# Patient Record
Sex: MALE | Race: BLACK OR AFRICAN AMERICAN | NOT HISPANIC OR LATINO | URBAN - METROPOLITAN AREA
[De-identification: names, ages, dates, MRNs, and addresses within clinical notes are randomized per-mention and may not be internally consistent; named-entity substitution may affect disease eponyms.]

---

## 2018-05-07 ENCOUNTER — HISTORICAL (OUTPATIENT)
Dept: RADIOLOGY | Facility: HOSPITAL | Age: 33
End: 2018-05-07

## 2022-04-07 ENCOUNTER — HISTORICAL (OUTPATIENT)
Dept: ADMINISTRATIVE | Facility: HOSPITAL | Age: 37
End: 2022-04-07

## 2022-04-24 VITALS
BODY MASS INDEX: 36.7 KG/M2 | WEIGHT: 276.88 LBS | DIASTOLIC BLOOD PRESSURE: 78 MMHG | OXYGEN SATURATION: 97 % | SYSTOLIC BLOOD PRESSURE: 142 MMHG | HEIGHT: 73 IN

## 2022-09-15 ENCOUNTER — HOSPITAL ENCOUNTER (EMERGENCY)
Facility: HOSPITAL | Age: 37
Discharge: HOME | End: 2022-09-16
Attending: FAMILY MEDICINE
Payer: MEDICAID

## 2022-09-15 DIAGNOSIS — K57.32 DIVERTICULITIS LARGE INTESTINE W/O PERFORATION OR ABSCESS W/O BLEEDING: Primary | ICD-10-CM

## 2022-09-15 LAB
ALBUMIN SERPL-MCNC: 4.2 GM/DL (ref 3.5–5)
ALBUMIN/GLOB SERPL: 1.1 RATIO (ref 1.1–2)
ALP SERPL-CCNC: 82 UNIT/L (ref 40–150)
ALT SERPL-CCNC: 33 UNIT/L (ref 0–55)
APPEARANCE UR: CLEAR
AST SERPL-CCNC: 19 UNIT/L (ref 5–34)
BACTERIA #/AREA URNS AUTO: ABNORMAL /HPF
BASOPHILS # BLD AUTO: 0.05 X10(3)/MCL (ref 0–0.2)
BASOPHILS NFR BLD AUTO: 0.5 %
BILIRUB UR QL STRIP.AUTO: NEGATIVE MG/DL
BILIRUBIN DIRECT+TOT PNL SERPL-MCNC: 0.4 MG/DL
BUN SERPL-MCNC: 9.7 MG/DL (ref 8.9–20.6)
CALCIUM SERPL-MCNC: 9.9 MG/DL (ref 8.4–10.2)
CHLORIDE SERPL-SCNC: 105 MMOL/L (ref 98–107)
CO2 SERPL-SCNC: 25 MMOL/L (ref 22–29)
COLOR UR AUTO: YELLOW
CREAT SERPL-MCNC: 0.77 MG/DL (ref 0.73–1.18)
EOSINOPHIL # BLD AUTO: 0.07 X10(3)/MCL (ref 0–0.9)
EOSINOPHIL NFR BLD AUTO: 0.7 %
ERYTHROCYTE [DISTWIDTH] IN BLOOD BY AUTOMATED COUNT: 13.2 % (ref 11.5–17)
GFR SERPLBLD CREATININE-BSD FMLA CKD-EPI: >60 MLS/MIN/1.73/M2
GLOBULIN SER-MCNC: 3.7 GM/DL (ref 2.4–3.5)
GLUCOSE SERPL-MCNC: 89 MG/DL (ref 74–100)
GLUCOSE UR QL STRIP.AUTO: NORMAL MG/DL
HCT VFR BLD AUTO: 48.1 % (ref 42–52)
HGB BLD-MCNC: 15.7 GM/DL (ref 14–18)
HYALINE CASTS #/AREA URNS LPF: ABNORMAL /LPF
IMM GRANULOCYTES # BLD AUTO: 0.02 X10(3)/MCL (ref 0–0.04)
IMM GRANULOCYTES NFR BLD AUTO: 0.2 %
KETONES UR QL STRIP.AUTO: NEGATIVE MG/DL
LEUKOCYTE ESTERASE UR QL STRIP.AUTO: NEGATIVE UNIT/L
LYMPHOCYTES # BLD AUTO: 3.03 X10(3)/MCL (ref 0.6–4.6)
LYMPHOCYTES NFR BLD AUTO: 32.3 %
MCH RBC QN AUTO: 26.5 PG (ref 27–31)
MCHC RBC AUTO-ENTMCNC: 32.6 MG/DL (ref 33–36)
MCV RBC AUTO: 81.1 FL (ref 80–94)
MONOCYTES # BLD AUTO: 0.47 X10(3)/MCL (ref 0.1–1.3)
MONOCYTES NFR BLD AUTO: 5 %
MUCOUS THREADS URNS QL MICRO: ABNORMAL /LPF
NEUTROPHILS # BLD AUTO: 5.7 X10(3)/MCL (ref 2.1–9.2)
NEUTROPHILS NFR BLD AUTO: 61.3 %
NITRITE UR QL STRIP.AUTO: NEGATIVE
NRBC BLD AUTO-RTO: 0 %
PH UR STRIP.AUTO: 6.5 [PH]
PLATELET # BLD AUTO: 270 X10(3)/MCL (ref 130–400)
PMV BLD AUTO: 10.6 FL (ref 7.4–10.4)
POTASSIUM SERPL-SCNC: 3.8 MMOL/L (ref 3.5–5.1)
PROT SERPL-MCNC: 7.9 GM/DL (ref 6.4–8.3)
PROT UR QL STRIP.AUTO: ABNORMAL MG/DL
RBC # BLD AUTO: 5.93 X10(6)/MCL (ref 4.7–6.1)
RBC #/AREA URNS AUTO: ABNORMAL /HPF
RBC UR QL AUTO: NEGATIVE UNIT/L
SODIUM SERPL-SCNC: 139 MMOL/L (ref 136–145)
SP GR UR STRIP.AUTO: 1.03
SQUAMOUS #/AREA URNS LPF: ABNORMAL /HPF
UROBILINOGEN UR STRIP-ACNC: NORMAL MG/DL
WBC # SPEC AUTO: 9.4 X10(3)/MCL (ref 4.5–11.5)
WBC #/AREA URNS AUTO: ABNORMAL /HPF

## 2022-09-15 PROCEDURE — 99285 EMERGENCY DEPT VISIT HI MDM: CPT | Mod: 25

## 2022-09-15 PROCEDURE — 25500020 PHARM REV CODE 255: Performed by: FAMILY MEDICINE

## 2022-09-15 PROCEDURE — 36415 COLL VENOUS BLD VENIPUNCTURE: CPT | Performed by: FAMILY MEDICINE

## 2022-09-15 PROCEDURE — 80053 COMPREHEN METABOLIC PANEL: CPT | Performed by: FAMILY MEDICINE

## 2022-09-15 PROCEDURE — 85025 COMPLETE CBC W/AUTO DIFF WBC: CPT | Performed by: FAMILY MEDICINE

## 2022-09-15 PROCEDURE — 96374 THER/PROPH/DIAG INJ IV PUSH: CPT

## 2022-09-15 PROCEDURE — 81001 URINALYSIS AUTO W/SCOPE: CPT | Performed by: FAMILY MEDICINE

## 2022-09-15 RX ORDER — AMOXICILLIN AND CLAVULANATE POTASSIUM 875; 125 MG/1; MG/1
1 TABLET, FILM COATED ORAL 2 TIMES DAILY
Qty: 20 TABLET | Refills: 0 | Status: SHIPPED | OUTPATIENT
Start: 2022-09-15 | End: 2022-09-25

## 2022-09-15 RX ORDER — KETOROLAC TROMETHAMINE 30 MG/ML
30 INJECTION, SOLUTION INTRAMUSCULAR; INTRAVENOUS
Status: COMPLETED | OUTPATIENT
Start: 2022-09-15 | End: 2022-09-16

## 2022-09-15 RX ORDER — ONDANSETRON 4 MG/1
4 TABLET, ORALLY DISINTEGRATING ORAL EVERY 6 HOURS PRN
Qty: 10 TABLET | Refills: 0 | Status: SHIPPED | OUTPATIENT
Start: 2022-09-15 | End: 2022-09-15 | Stop reason: SDUPTHER

## 2022-09-15 RX ORDER — AMLODIPINE BESYLATE 10 MG/1
10 TABLET ORAL DAILY
COMMUNITY
Start: 2022-09-04

## 2022-09-15 RX ORDER — ONDANSETRON 4 MG/1
4 TABLET, ORALLY DISINTEGRATING ORAL EVERY 6 HOURS PRN
Qty: 10 TABLET | Refills: 0 | Status: SHIPPED | OUTPATIENT
Start: 2022-09-15 | End: 2022-09-20

## 2022-09-15 RX ORDER — IBUPROFEN 600 MG/1
600 TABLET ORAL EVERY 6 HOURS PRN
Qty: 40 TABLET | Refills: 0 | Status: SHIPPED | OUTPATIENT
Start: 2022-09-15 | End: 2022-09-15 | Stop reason: SDUPTHER

## 2022-09-15 RX ORDER — ATENOLOL 25 MG/1
25 TABLET ORAL DAILY
COMMUNITY
Start: 2022-09-04

## 2022-09-15 RX ORDER — IBUPROFEN 600 MG/1
600 TABLET ORAL EVERY 6 HOURS PRN
Qty: 40 TABLET | Refills: 0 | Status: SHIPPED | OUTPATIENT
Start: 2022-09-15 | End: 2022-09-25

## 2022-09-15 RX ORDER — ALPRAZOLAM 0.5 MG/1
0.5 TABLET ORAL NIGHTLY
COMMUNITY
Start: 2022-09-02

## 2022-09-15 RX ORDER — AMOXICILLIN AND CLAVULANATE POTASSIUM 875; 125 MG/1; MG/1
1 TABLET, FILM COATED ORAL 2 TIMES DAILY
Qty: 20 TABLET | Refills: 0 | Status: SHIPPED | OUTPATIENT
Start: 2022-09-15 | End: 2022-09-15 | Stop reason: SDUPTHER

## 2022-09-15 RX ADMIN — IOHEXOL 100 ML: 350 INJECTION, SOLUTION INTRAVENOUS at 11:09

## 2022-09-16 VITALS
OXYGEN SATURATION: 100 % | RESPIRATION RATE: 17 BRPM | DIASTOLIC BLOOD PRESSURE: 92 MMHG | BODY MASS INDEX: 40.32 KG/M2 | WEIGHT: 304.25 LBS | HEIGHT: 73 IN | TEMPERATURE: 97 F | HEART RATE: 90 BPM | SYSTOLIC BLOOD PRESSURE: 142 MMHG

## 2022-09-16 PROCEDURE — 63600175 PHARM REV CODE 636 W HCPCS: Performed by: FAMILY MEDICINE

## 2022-09-16 PROCEDURE — 25500020 PHARM REV CODE 255: Performed by: FAMILY MEDICINE

## 2022-09-16 RX ADMIN — KETOROLAC TROMETHAMINE 30 MG: 30 INJECTION, SOLUTION INTRAMUSCULAR; INTRAVENOUS at 12:09

## 2022-09-16 NOTE — ED PROVIDER NOTES
Encounter Date: 9/15/2022       History     Chief Complaint   Patient presents with    Rectal Pain     Chronic rectal pain     37-year-old gentleman presents emergency room with complaints of rectal pain has been ongoing for the last few weeks.  Pain with having bowel movements.  Denies constipation, reports passing normal loose stools.  Patient reports discomfort when pass the bowel movement, and slight relief following a bowel movement.  No fever chills.  Patient does reports some slight left lower quadrant abdominal pain at times, but none currently.  Denies dysuria or hematuria.    The history is provided by the patient.   Review of patient's allergies indicates:  No Known Allergies  History reviewed. No pertinent past medical history.  History reviewed. No pertinent surgical history.  History reviewed. No pertinent family history.     Review of Systems   Constitutional:  Negative for chills, fatigue and fever.   HENT:  Negative for ear pain, rhinorrhea and sore throat.    Eyes:  Negative for photophobia and pain.   Respiratory:  Negative for cough, shortness of breath and wheezing.    Cardiovascular:  Negative for chest pain.   Gastrointestinal:  Positive for rectal pain. Negative for abdominal pain, diarrhea, nausea and vomiting.   Genitourinary:  Negative for dysuria.   Neurological:  Negative for dizziness, weakness and headaches.   All other systems reviewed and are negative.    Physical Exam     Initial Vitals [09/15/22 2028]   BP Pulse Resp Temp SpO2   (!) 148/91 110 20 97.2 °F (36.2 °C) 99 %      MAP       --         Physical Exam    Nursing note and vitals reviewed.  Constitutional: He appears well-developed and well-nourished.   HENT:   Head: Normocephalic and atraumatic.   Eyes: EOM are normal. Pupils are equal, round, and reactive to light.   Neck: Neck supple.   Normal range of motion.  Cardiovascular:  Normal rate, regular rhythm and normal heart sounds.     Exam reveals no gallop and no friction  rub.       No murmur heard.  Pulmonary/Chest: Breath sounds normal. No respiratory distress.   Abdominal: Abdomen is soft. Bowel sounds are normal. He exhibits no distension. There is no abdominal tenderness.   Genitourinary:    Genitourinary Comments: Small external hemorrhoid at the 3:00 a.m. lithotomy position     Musculoskeletal:         General: Normal range of motion.      Cervical back: Normal range of motion and neck supple.     Neurological: He is alert and oriented to person, place, and time. He has normal strength.   Skin: Skin is warm and dry. Capillary refill takes less than 2 seconds.   Psychiatric: He has a normal mood and affect. His behavior is normal. Judgment and thought content normal.       ED Course   Procedures  Labs Reviewed   COMPREHENSIVE METABOLIC PANEL - Abnormal; Notable for the following components:       Result Value    Globulin 3.7 (*)     All other components within normal limits   URINALYSIS, REFLEX TO URINE CULTURE - Abnormal; Notable for the following components:    Protein, UA Trace (*)     Squamous Epithelial Cells, UA Trace (*)     Mucous, UA Occ (*)     All other components within normal limits   CBC WITH DIFFERENTIAL - Abnormal; Notable for the following components:    MCH 26.5 (*)     MCHC 32.6 (*)     MPV 10.6 (*)     All other components within normal limits   CBC W/ AUTO DIFFERENTIAL    Narrative:     The following orders were created for panel order CBC Auto Differential.  Procedure                               Abnormality         Status                     ---------                               -----------         ------                     CBC with Differential[[id number]]        Abnormal            Final result                 Please view results for these tests on the individual orders.          Imaging Results              CT Abdomen Pelvis With Contrast (Preliminary result)  Result time 09/15/22 23:08:43      Preliminary result by Werner Guerrero Jr., MD  (09/15/22 23:08:43)                   Narrative:    START OF REPORT:  Technique: CT of the abdomen and pelvis was performed with axial images as well as sagittal and coronal reconstruction images without intravenous contrast.    Comparison: Comparison is with study [id number] 17:08:14.    Clinical History: Chronic rectal pain, llq pain.    Dosage Information: Automated Exposure Control was utilized.    Findings:  Lines and Tubes: None.  Thorax:  Lungs: There is a band-like opacity in the right upper lobe which may reflect subsegmental atelectasis or scarring.  Pleura: No pleural effusion is seen.  Heart: The heart size is within normal limits.  Abdomen:  Abdominal Wall: No abdominal wall pathology is seen.  Liver: Again noted is mild fatty infiltration of the liver.  Biliary System: No extrahepatic biliary duct dilatation is seen.  Gallbladder: The gallbladder appears unremarkable.  Pancreas: The pancreas appears unremarkable.  Spleen: The spleen appears unremarkable.  Adrenals: The adrenal glands appear unremarkable.  Kidneys: The left kidney appears unremarkable with no stones cysts masses or hydronephrosis. There is a 9 mm hypodensity in the mid segment of the right kidney (series 2 image 48). This has increased in size since the prior examination and is too small to further characterize. The right kidney otherwise appears unremarkable.  Aorta: The abdominal aorta appears unremarkable.  IVC: Unremarkable.  Bowel:  Esophagus: The visualized esophagus appears unremarkable.  Stomach: The wall of body and antrum of stomach seems thick. Check for gastritis.  Duodenum: Unremarkable appearing duodenum.  Small Bowel: The small bowel appears unremarkable.  Colon: Multiple diverticula are seen in the descending and sigmoid colon. There is focal fat stranding in the pericolonic space along the sigmoid colon at the location of the previously present diverticulitis (series 2 image 83). This suggests recurrent  diverticulitis.  Appendix: The appendix appears unremarkable.  Peritoneum: No intraperitoneal free air or ascites is seen.    Pelvis:  Bladder: The bladder appears unremarkable.  Male:  Prostate gland: The prostate gland appears unremarkable.    Bony structures:  Dorsal Spine: There is mild spondylosis of the visualized dorsal spine.  Bony Pelvis: The visualized bony structures of the pelvis appear unremarkable.      Impression:  1. Multiple diverticula are seen in the descending and sigmoid colon. There is focal fat stranding in the pericolonic space along the sigmoid colon at the location of the previously present diverticulitis (series 2 image 83). This suggests recurrent diverticulitis. Correlate with clinical and laboratory findings as regards additional evaluation and follow-up.  2. The wall of body and antrum of stomach seems thick. Check for gastritis.  3. Details as above.                          Preliminary result by Interface, Rad Results In (09/15/22 23:08:43)                   Narrative:    START OF REPORT:  Technique: CT of the abdomen and pelvis was performed with axial images as well as sagittal and coronal reconstruction images without intravenous contrast.    Comparison: Comparison is with study [id number] 17:08:14.    Clinical History: Chronic rectal pain, llq pain.    Dosage Information: Automated Exposure Control was utilized.    Findings:  Lines and Tubes: None.  Thorax:  Lungs: There is a band-like opacity in the right upper lobe which may reflect subsegmental atelectasis or scarring.  Pleura: No pleural effusion is seen.  Heart: The heart size is within normal limits.  Abdomen:  Abdominal Wall: No abdominal wall pathology is seen.  Liver: Again noted is mild fatty infiltration of the liver.  Biliary System: No extrahepatic biliary duct dilatation is seen.  Gallbladder: The gallbladder appears unremarkable.  Pancreas: The pancreas appears unremarkable.  Spleen: The spleen appears  unremarkable.  Adrenals: The adrenal glands appear unremarkable.  Kidneys: The left kidney appears unremarkable with no stones cysts masses or hydronephrosis. There is a 9 mm hypodensity in the mid segment of the right kidney (series 2 image 48). This has increased in size since the prior examination and is too small to further characterize. The right kidney otherwise appears unremarkable.  Aorta: The abdominal aorta appears unremarkable.  IVC: Unremarkable.  Bowel:  Esophagus: The visualized esophagus appears unremarkable.  Stomach: The wall of body and antrum of stomach seems thick. Check for gastritis.  Duodenum: Unremarkable appearing duodenum.  Small Bowel: The small bowel appears unremarkable.  Colon: Multiple diverticula are seen in the descending and sigmoid colon. There is focal fat stranding in the pericolonic space along the sigmoid colon at the location of the previously present diverticulitis (series 2 image 83). This suggests recurrent diverticulitis.  Appendix: The appendix appears unremarkable.  Peritoneum: No intraperitoneal free air or ascites is seen.    Pelvis:  Bladder: The bladder appears unremarkable.  Male:  Prostate gland: The prostate gland appears unremarkable.    Bony structures:  Dorsal Spine: There is mild spondylosis of the visualized dorsal spine.  Bony Pelvis: The visualized bony structures of the pelvis appear unremarkable.      Impression:  1. Multiple diverticula are seen in the descending and sigmoid colon. There is focal fat stranding in the pericolonic space along the sigmoid colon at the location of the previously present diverticulitis (series 2 image 83). This suggests recurrent diverticulitis. Correlate with clinical and laboratory findings as regards additional evaluation and follow-up.  2. The wall of body and antrum of stomach seems thick. Check for gastritis.  3. Details as above.                                         Medications   ketorolac injection 30 mg (has no  administration in time range)   iohexoL (OMNIPAQUE 350) injection 100 mL (100 mLs Intravenous Given 9/15/22 2310)     Medical Decision Making:   Initial Assessment:   Patient currently nontoxic and in no distress.  Likely pain from an external hemorrhoid.  Due to history of diverticulitis, will obtain laboratory evaluation as well as a CT scan for further evaluation.           ED Course as of 09/15/22 2339   Thu Sep 15, 2022   2156 WBC: 9.4 [MW]   2156 Hemoglobin: 15.7 [MW]   2156 Hematocrit: 48.1 [MW]   2156 Platelets: 270 [MW]   2156 Sodium: 139 [MW]   2156 Potassium: 3.8 [MW]   2156 Chloride: 105 [MW]   2156 CO2: 25 [MW]   2156 Glucose: 89 [MW]   2156 BUN: 9.7 [MW]   2156 Creatinine: 0.77 [MW]   2332 Color, UA: Yellow [MW]   2332 Appearance, UA: Clear [MW]   2332 Squamous Epithelial Cells, UA(!): Trace [MW]   2332 Bacteria, UA: None Seen [MW]   2335 Discussed the patient regarding findings.  Findings appear consistent likely with diverticulitis causes discomfort.  Will treat with antibiotics.  Since having recurrent flares of diverticulitis, will refer to GI for further evaluation. [MW]      ED Course User Index  [MW] Obdulio Gutierrez MD                 Clinical Impression:   Final diagnoses:  [K57.32] Diverticulitis large intestine w/o perforation or abscess w/o bleeding (Primary)      ED Disposition Condition    Discharge Stable          ED Prescriptions       Medication Sig Dispense Start Date End Date Auth. Provider    amoxicillin-clavulanate 875-125mg (AUGMENTIN) 875-125 mg per tablet  (Status: Discontinued) Take 1 tablet by mouth 2 (two) times daily. for 10 days 20 tablet 9/15/2022 9/15/2022 Obdulio Gutierrez MD    ibuprofen (ADVIL,MOTRIN) 600 MG tablet  (Status: Discontinued) Take 1 tablet (600 mg total) by mouth every 6 (six) hours as needed for Pain. 40 tablet 9/15/2022 9/15/2022 Obdulio Gutierrez MD    ondansetron (ZOFRAN-ODT) 4 MG TbDL  (Status: Discontinued) Take 1 tablet (4 mg total) by  mouth every 6 (six) hours as needed (nausea vomiting). 10 tablet 9/15/2022 9/15/2022 Obdulio Gutierrez MD    amoxicillin-clavulanate 875-125mg (AUGMENTIN) 875-125 mg per tablet Take 1 tablet by mouth 2 (two) times daily. for 10 days 20 tablet 9/15/2022 9/25/2022 Obdulio Gutierrez MD    ibuprofen (ADVIL,MOTRIN) 600 MG tablet Take 1 tablet (600 mg total) by mouth every 6 (six) hours as needed for Pain. 40 tablet 9/15/2022 9/25/2022 Obdulio Gutierrez MD    ondansetron (ZOFRAN-ODT) 4 MG TbDL Take 1 tablet (4 mg total) by mouth every 6 (six) hours as needed (nausea vomiting). 10 tablet 9/15/2022 9/20/2022 Obdulio Gutierrez MD          Follow-up Information       Follow up With Specialties Details Why Contact Info [facility] Emergency Medicine  As needed, If symptoms worsen [address] [facility] [id number] [phone]    Primary Care Physician  In 5 days      Aliza Pulido MD Gastroenterology   [address] [facility] [address]  [phone]               Obdulio Gutierrez MD  09/15/22 0123

## 2022-09-18 ENCOUNTER — HOSPITAL ENCOUNTER (EMERGENCY)
Facility: HOSPITAL | Age: 37
Discharge: HOME | End: 2022-09-19
Attending: FAMILY MEDICINE
Payer: MEDICAID

## 2022-09-18 DIAGNOSIS — K57.30 DIVERTICULOSIS LARGE INTESTINE W/O PERFORATION OR ABSCESS W/O BLEEDING: ICD-10-CM

## 2022-09-18 DIAGNOSIS — K61.1 PERIRECTAL ABSCESS: Primary | ICD-10-CM

## 2022-09-18 LAB
ALBUMIN SERPL-MCNC: 3.9 GM/DL (ref 3.5–5)
ALBUMIN/GLOB SERPL: 0.9 RATIO (ref 1.1–2)
ALP SERPL-CCNC: 75 UNIT/L (ref 40–150)
ALT SERPL-CCNC: 38 UNIT/L (ref 0–55)
AST SERPL-CCNC: 26 UNIT/L (ref 5–34)
BASOPHILS # BLD AUTO: 0.05 X10(3)/MCL (ref 0–0.2)
BASOPHILS NFR BLD AUTO: 0.5 %
BILIRUBIN DIRECT+TOT PNL SERPL-MCNC: 0.4 MG/DL
BUN SERPL-MCNC: 12.8 MG/DL (ref 8.9–20.6)
CALCIUM SERPL-MCNC: 9.6 MG/DL (ref 8.4–10.2)
CHLORIDE SERPL-SCNC: 101 MMOL/L (ref 98–107)
CO2 SERPL-SCNC: 26 MMOL/L (ref 22–29)
CREAT SERPL-MCNC: 0.96 MG/DL (ref 0.73–1.18)
CRP SERPL-MCNC: 136.7 MG/L
EOSINOPHIL # BLD AUTO: 0.01 X10(3)/MCL (ref 0–0.9)
EOSINOPHIL NFR BLD AUTO: 0.1 %
ERYTHROCYTE [DISTWIDTH] IN BLOOD BY AUTOMATED COUNT: 12.9 % (ref 11.5–17)
GFR SERPLBLD CREATININE-BSD FMLA CKD-EPI: >60 MLS/MIN/1.73/M2
GLOBULIN SER-MCNC: 4.2 GM/DL (ref 2.4–3.5)
GLUCOSE SERPL-MCNC: 97 MG/DL (ref 74–100)
HCT VFR BLD AUTO: 48.5 % (ref 42–52)
HGB BLD-MCNC: 15.7 GM/DL (ref 14–18)
IMM GRANULOCYTES # BLD AUTO: 0.05 X10(3)/MCL (ref 0–0.04)
IMM GRANULOCYTES NFR BLD AUTO: 0.5 %
LACTATE SERPL-SCNC: 1.7 MMOL/L (ref 0.5–2.2)
LYMPHOCYTES # BLD AUTO: 1.3 X10(3)/MCL (ref 0.6–4.6)
LYMPHOCYTES NFR BLD AUTO: 12.7 %
MCH RBC QN AUTO: 26.4 PG (ref 27–31)
MCHC RBC AUTO-ENTMCNC: 32.4 MG/DL (ref 33–36)
MCV RBC AUTO: 81.6 FL (ref 80–94)
MONOCYTES # BLD AUTO: 0.66 X10(3)/MCL (ref 0.1–1.3)
MONOCYTES NFR BLD AUTO: 6.4 %
NEUTROPHILS # BLD AUTO: 8.2 X10(3)/MCL (ref 2.1–9.2)
NEUTROPHILS NFR BLD AUTO: 79.8 %
NRBC BLD AUTO-RTO: 0 %
PLATELET # BLD AUTO: 258 X10(3)/MCL (ref 130–400)
PMV BLD AUTO: 10.8 FL (ref 7.4–10.4)
POTASSIUM SERPL-SCNC: 3.6 MMOL/L (ref 3.5–5.1)
PROT SERPL-MCNC: 8.1 GM/DL (ref 6.4–8.3)
RBC # BLD AUTO: 5.94 X10(6)/MCL (ref 4.7–6.1)
SODIUM SERPL-SCNC: 137 MMOL/L (ref 136–145)
WBC # SPEC AUTO: 10.3 X10(3)/MCL (ref 4.5–11.5)

## 2022-09-18 PROCEDURE — 25000003 PHARM REV CODE 250: Performed by: PHYSICIAN ASSISTANT

## 2022-09-18 PROCEDURE — 25500020 PHARM REV CODE 255: Performed by: FAMILY MEDICINE

## 2022-09-18 PROCEDURE — 96375 TX/PRO/DX INJ NEW DRUG ADDON: CPT

## 2022-09-18 PROCEDURE — 36415 COLL VENOUS BLD VENIPUNCTURE: CPT | Performed by: FAMILY MEDICINE

## 2022-09-18 PROCEDURE — 63600175 PHARM REV CODE 636 W HCPCS: Performed by: PHYSICIAN ASSISTANT

## 2022-09-18 PROCEDURE — 80053 COMPREHEN METABOLIC PANEL: CPT | Performed by: PHYSICIAN ASSISTANT

## 2022-09-18 PROCEDURE — 36415 COLL VENOUS BLD VENIPUNCTURE: CPT | Performed by: PHYSICIAN ASSISTANT

## 2022-09-18 PROCEDURE — 87040 BLOOD CULTURE FOR BACTERIA: CPT | Performed by: PHYSICIAN ASSISTANT

## 2022-09-18 PROCEDURE — 85025 COMPLETE CBC W/AUTO DIFF WBC: CPT | Performed by: PHYSICIAN ASSISTANT

## 2022-09-18 PROCEDURE — 46040 I&D ISCHIORCT&/PERIRCT ABSC: CPT

## 2022-09-18 PROCEDURE — 86140 C-REACTIVE PROTEIN: CPT | Performed by: PHYSICIAN ASSISTANT

## 2022-09-18 PROCEDURE — 99285 EMERGENCY DEPT VISIT HI MDM: CPT | Mod: 25

## 2022-09-18 PROCEDURE — 83605 ASSAY OF LACTIC ACID: CPT | Performed by: PHYSICIAN ASSISTANT

## 2022-09-18 PROCEDURE — 96374 THER/PROPH/DIAG INJ IV PUSH: CPT

## 2022-09-18 PROCEDURE — 96361 HYDRATE IV INFUSION ADD-ON: CPT

## 2022-09-18 RX ORDER — MORPHINE SULFATE 2 MG/ML
4 INJECTION, SOLUTION INTRAMUSCULAR; INTRAVENOUS ONCE
Status: COMPLETED | OUTPATIENT
Start: 2022-09-18 | End: 2022-09-18

## 2022-09-18 RX ORDER — IBUPROFEN 400 MG/1
800 TABLET ORAL
Status: COMPLETED | OUTPATIENT
Start: 2022-09-18 | End: 2022-09-18

## 2022-09-18 RX ORDER — ONDANSETRON 2 MG/ML
4 INJECTION INTRAMUSCULAR; INTRAVENOUS
Status: COMPLETED | OUTPATIENT
Start: 2022-09-18 | End: 2022-09-18

## 2022-09-18 RX ADMIN — IOHEXOL 100 ML: 350 INJECTION, SOLUTION INTRAVENOUS at 11:09

## 2022-09-18 RX ADMIN — MORPHINE SULFATE 4 MG: 2 INJECTION, SOLUTION INTRAMUSCULAR; INTRAVENOUS at 08:09

## 2022-09-18 RX ADMIN — ONDANSETRON 4 MG: 2 INJECTION INTRAMUSCULAR; INTRAVENOUS at 08:09

## 2022-09-18 RX ADMIN — SODIUM CHLORIDE, POTASSIUM CHLORIDE, SODIUM LACTATE AND CALCIUM CHLORIDE 1000 ML: 600; 310; 30; 20 INJECTION, SOLUTION INTRAVENOUS at 08:09

## 2022-09-18 RX ADMIN — IBUPROFEN 800 MG: 400 TABLET ORAL at 08:09

## 2022-09-19 VITALS
WEIGHT: 299.81 LBS | HEART RATE: 77 BPM | RESPIRATION RATE: 20 BRPM | DIASTOLIC BLOOD PRESSURE: 79 MMHG | BODY MASS INDEX: 39.73 KG/M2 | TEMPERATURE: 99 F | OXYGEN SATURATION: 98 % | HEIGHT: 73 IN | SYSTOLIC BLOOD PRESSURE: 130 MMHG

## 2022-09-19 PROCEDURE — 25500020 PHARM REV CODE 255: Performed by: FAMILY MEDICINE

## 2022-09-19 RX ORDER — SULFAMETHOXAZOLE AND TRIMETHOPRIM 800; 160 MG/1; MG/1
1 TABLET ORAL 2 TIMES DAILY
Qty: 20 TABLET | Refills: 0 | Status: SHIPPED | OUTPATIENT
Start: 2022-09-19 | End: 2022-09-29

## 2022-09-19 NOTE — ED PROVIDER NOTES
Encounter Date: 9/18/2022       History     Chief Complaint   Patient presents with    Abscess     CO OF ABSCESS ON BUTTOCK SINCE YESTERDAY      Patient is a 37 year old male who presents to the ED with abscess to buttock x yesterday, chills, headache, body aches, elevated temperature.  He rates pain 10/10.  He's applied warm compresses, soaked in epsom salt bath.  He is currently taking Augmentin ( since 9/15/22) due to diverticulitis of large intestine.  He denies CP, SOB, nausea, vomiting.      The history is provided by the patient. No  was used.   Abscess   This is a new problem. The current episode started yesterday. The problem has been rapidly worsening. Affected Location: buttock. The pain is at a severity of 10/10. The abscess is characterized by painfulness. Associated symptoms include a fever (elevated temperature). Pertinent negatives include no vomiting and no cough.   Review of patient's allergies indicates:  No Known Allergies  No past medical history on file.  No past surgical history on file.  No family history on file.     Review of Systems   Constitutional:  Positive for chills and fever (elevated temperature).   Respiratory:  Negative for cough and shortness of breath.    Cardiovascular:  Negative for chest pain and palpitations.   Gastrointestinal:  Negative for nausea and vomiting.   Genitourinary: Negative.    Musculoskeletal: Negative.    Skin:         Abscess on buttock   Neurological:  Positive for seizures and headaches. Negative for dizziness and light-headedness.   Hematological: Negative.      Physical Exam     Initial Vitals [09/18/22 1856]   BP Pulse Resp Temp SpO2   (!) 148/88 (!) 112 18 99 °F (37.2 °C) (!) 87 %      MAP       --         Physical Exam    Nursing note and vitals reviewed.  Constitutional: He appears well-developed and well-nourished.   HENT:   Nose: Nose normal.   Mouth/Throat: Oropharynx is clear and moist.   Eyes: Conjunctivae are normal.    Neck: Neck supple.   Normal range of motion.  Cardiovascular:  Normal rate, normal heart sounds and intact distal pulses.           Pulmonary/Chest: Breath sounds normal.   Genitourinary:    Genitourinary Comments: Perirectal abscess, 1.5 cm x 1.5 cm, actively draining          Musculoskeletal:      Cervical back: Normal range of motion and neck supple.     Neurological: He is alert.   Skin: Skin is warm.       ED Course   I & D - Incision and Drainage    Date/Time: 9/18/2022 8:51 PM  Location procedure was performed: [facility] EMERGENCY DEPARTMENT  Performed by: KARINA Holder  Authorized by: KARINA Holder   Consent Done: Yes  Consent: Verbal consent obtained.  Risks and benefits: risks, benefits and alternatives were discussed  Consent given by: patient  Patient understanding: patient states understanding of the procedure being performed  Type: abscess  Location: perirectal.  Anesthesia method: none, began to drain while waiting in ED.    Patient sedated: no  Scalpel size: 11  Drainage: purulent  Drainage amount: moderate      Labs Reviewed   COMPREHENSIVE METABOLIC PANEL - Abnormal; Notable for the following components:       Result Value    Globulin 4.2 (*)     Albumin/Globulin Ratio 0.9 (*)     All other components within normal limits   C-REACTIVE PROTEIN - Abnormal; Notable for the following components:    C-Reactive Protein 136.70 (*)     All other components within normal limits   CBC WITH DIFFERENTIAL - Abnormal; Notable for the following components:    MCH 26.4 (*)     MCHC 32.4 (*)     MPV 10.8 (*)     IG# 0.05 (*)     All other components within normal limits   LACTIC ACID, PLASMA - Normal   BLOOD CULTURE [facility]   BLOOD CULTURE [facility]   CBC W/ AUTO DIFFERENTIAL    Narrative:     The following orders were created for panel order CBC Auto Differential.  Procedure                               Abnormality         Status                     ---------                               -----------         ------                      CBC with Differential[[id number]]        Abnormal            Final result                 Please view results for these tests on the individual orders.   EXTRA TUBES    Narrative:     The following orders were created for panel order EXTRA TUBES.  Procedure                               Abnormality         Status                     ---------                               -----------         ------                     Light Blue Top Hold[[id number]]                              In process                 Red Top Hold[[id number]]                                     In process                 Gold Top Hold[[id number]]                                    In process                   Please view results for these tests on the individual orders.   LIGHT BLUE TOP HOLD   RED TOP HOLD   GOLD TOP HOLD          Imaging Results              CT Abdomen Pelvis With Contrast (Preliminary result)  Result time 09/18/22 23:17:31      Preliminary result by Heraclio Goodson MD (09/18/22 23:17:31)                   Narrative:    START OF REPORT:  Technique: CT of the abdomen and pelvis was performed with axial images as well as sagittal and coronal reconstruction images with intravenous contrast.    Comparison: Comparison is with study [id number] 22:53:08.    Clinical History: CO OF ABSCESS ON BUTTOCK SINCE YESTERDAY.    Dosage Information: Automated Exposure Control was utilized.    Findings:  Lines and Tubes: None.  Thorax:  Lungs: There is minimal nonspecific dependent change at the lung bases. No focal infiltrate or consolidation is seen.  Pleura: No effusions or thickening are seen. No pneumothorax is seen in the visualized lung bases.  Heart: The heart size is within normal limits.  Abdomen:  Abdominal Wall: There is a small uncomplicated umbilical hernia which contains mesenteric fat. There is extensive skin and subcutaneous thickening with mild enhancement seen posteriorly in the left medial gluteal region  extending to the perianal and perineal regions, seen best on Series 8 Image 90. There's a skin defect seen overlying the same induration. Focal gas bubbles are seen within this thickening with some areas of hypodensity, suggesting impending abscess formation. without any other overt signs of collections. As compared to the previous there is marked reduction of the induration and collection within, which had been overlying the sacral region.  Liver: There is a small cyst seen superiorly in the right lobe of the liver. The liver otherwise appears unremarkable.  Biliary System: No intrahepatic or extrahepatic biliary duct dilatation is seen.  Gallbladder: The gallbladder appears unremarkable.  Pancreas: The pancreas appears unremarkable.  Spleen: The spleen appears unremarkable.  Adrenals: The adrenal glands appear unremarkable.  Kidneys: The left kidney appears unremarkable with no stones cysts masses or hydronephrosis. Multiple tiny cysts are identified in the right kidney the largest of which measures â9.8 mmâ is on image 45 series 2 mid pole of the right kidney.  Aorta: The abdominal aorta appears unremarkable.  IVC: Unremarkable.  Bowel:  Esophagus: The visualized esophagus appears unremarkable.  Stomach: The stomach appears unremarkable.  Duodenum: Unremarkable appearing duodenum.  Small Bowel: The small bowel appears unremarkable.  Colon: There is moderate stool in the colon which could reflect an element of constipation. Multiple diverticula are seen predominantly in the sigmoid colon. No associated inflammatory stranding is seen to suggest diverticulitis. Correlate with clinical and laboratory findings.  Appendix: The appendix appears unremarkable and is seen on image 72 series 2.  Peritoneum: No intraperitoneal free air or ascites is seen.    Pelvis:  Bladder: The bladder is nondistended and cannot be definitively evaluated.  Male:  Prostate gland: The prostate gland appears unremarkable.    Bony  structures:  Dorsal Spine: The visualized dorsal spine appears unremarkable.  Bony Pelvis: The visualized bony structures of the pelvis appear unremarkable.      Impression:  1. There is extensive skin and subcutaneous thickening with mild enhancement seen posteriorly in the left medial gluteal region extending to the perianal and perineal regions, seen best on Series 8 Image 90. There's a skin defect seen overlying the same induration. Focal gas bubbles are seen within this thickening with some areas of hypodensity, suggesting impending abscess formation. without any other overt signs of collections. As compared to the previous there is marked reduction of the induration and collection within, which had been overlying the sacral region. Correlate with clinical and laboratory findings as regards additional evaluation and follow up as indicated.  2. Details and other findings as discussed above.                          Preliminary result by Interface, Rad Results In (09/18/22 23:17:31)                   Narrative:    START OF REPORT:  Technique: CT of the abdomen and pelvis was performed with axial images as well as sagittal and coronal reconstruction images with intravenous contrast.    Comparison: Comparison is with study [id number] 22:53:08.    Clinical History: CO OF ABSCESS ON BUTTOCK SINCE YESTERDAY.    Dosage Information: Automated Exposure Control was utilized.    Findings:  Lines and Tubes: None.  Thorax:  Lungs: There is minimal nonspecific dependent change at the lung bases. No focal infiltrate or consolidation is seen.  Pleura: No effusions or thickening are seen. No pneumothorax is seen in the visualized lung bases.  Heart: The heart size is within normal limits.  Abdomen:  Abdominal Wall: There is a small uncomplicated umbilical hernia which contains mesenteric fat. There is extensive skin and subcutaneous thickening with mild enhancement seen posteriorly in the left medial gluteal region  structures:  Dorsal Spine: The visualized dorsal spine appears unremarkable.  Bony Pelvis: The visualized bony structures of the pelvis appear unremarkable.      Impression:  1. There is extensive skin and subcutaneous thickening with mild enhancement seen posteriorly in the left medial gluteal region extending to the perianal and perineal regions, seen best on Series 8 Image 90. There's a skin defect seen overlying the same induration. Focal gas bubbles are seen within this thickening with some areas of hypodensity, suggesting impending abscess formation. without any other overt signs of collections. As compared to the previous there is marked reduction of the induration and collection within, which had been overlying the sacral region. Correlate with clinical and laboratory findings as regards additional evaluation and follow up as indicated.  2. Details and other findings as discussed above.                                         Medications   morphine injection 4 mg (4 mg Intravenous Given 9/18/22 2057)   ondansetron injection 4 mg (4 mg Intravenous Given 9/18/22 2057)   lactated ringers bolus 1,000 mL (0 mLs Intravenous Stopped 9/18/22 2158)   ibuprofen tablet 800 mg (800 mg Oral Given 9/18/22 2057)   iohexoL (OMNIPAQUE 350) injection 100 mL (100 mLs Intravenous Given 9/18/22 2313)                 ED Course as of 09/19/22 1409   Sun Sep 18, 2022   2104 Transitioned this patient Dr. Gutierrez at this time [ER]   Mon Sep 19, 2022   0115 Due to perirectal abscess with gas formation, will consult General surgery has been consulted for further evaluation. [MW]   0245 General Surgery has seen and evaluated the patient - ok to DC to home.  Recommend sitz baths.  Request that a referral for colonoscopy be placed for outpatient. [MW]      ED Course User Index  [ER] KARINA Holder  [MW] Obdulio Gutierrez MD                 Clinical Impression:   Final diagnoses:  [K61.1] Perirectal abscess (Primary)  [K57.30]  Diverticulosis large intestine w/o perforation or abscess w/o bleeding      ED Disposition Condition    Discharge Stable          ED Prescriptions       Medication Sig Dispense Start Date End Date Auth. Provider    sulfamethoxazole-trimethoprim 800-160mg (BACTRIM DS) 800-160 mg Tab Take 1 tablet by mouth 2 (two) times daily. for 10 days 20 tablet 9/19/2022 9/29/2022 Obdulio Gutierrez MD          Follow-up Information       Follow up With Specialties Details Why Contact Info Additional Information    [facility] Emergency Dept Emergency Medicine  As needed, If symptoms worsen [address] [facility] [id number] [phone]     [facility] Internal [facility] Internal Medicine  Next available. [address] [facility] [address] [phone] Internal Medicine Clinic Entrance #1    [facility] Gastroenterology   [address] [id number] [phone]              Obdulio Gutierrez MD  09/19/22 0249       KARINA Holder  09/19/22 7960

## 2022-09-19 NOTE — CONSULTS
"[facility]  ADMIT / CONSULT / H&P NOTE  Date: 9/19/2022    CC:   Dilan Renteria Jr. is a 37 y.o. male presenting with perirectal pain of ~1 week duration. General surgery was consulted for evaluation of a perirectal abscess    HPI:   Dilan Renteria Jr. is a 38 yo male with a PMH of HTN, GERD, and prior diverticulitis who presents with perirectal pain since last Monday. Patient states he noticed pain near the left side of his anus which worsened ultimately leading him to the ED tonight. He notes that it began draining purulent fluid while in the ED. He has never experienced episodes like this before but has had groin abscesses in the past. On exam, patient not in pain and noted that "a lot" had drained out of it prior to our arrival. Denies f/c, n/v, bloody stools, changes in bowel habits. Endorses perirectal pain on the left side    ROS:  See HPI    PMH:   No past medical history on file.    PSH:   No past surgical history on file.    FamHx:   No family history on file.    SocHx:  Social History     Socioeconomic History    Marital status: Single       Allergies:   Review of patient's allergies indicates:  No Known Allergies    Medications:  No current facility-administered medications on file prior to encounter.     Current Outpatient Medications on File Prior to Encounter   Medication Sig Dispense Refill    ALPRAZolam (XANAX) 0.5 MG tablet Take 0.5 mg by mouth every evening.      amLODIPine (NORVASC) 10 MG tablet Take 10 mg by mouth once daily.      amoxicillin-clavulanate 875-125mg (AUGMENTIN) 875-125 mg per tablet Take 1 tablet by mouth 2 (two) times daily. for 10 days 20 tablet 0    atenoloL (TENORMIN) 25 MG tablet Take 25 mg by mouth once daily.      ibuprofen (ADVIL,MOTRIN) 600 MG tablet Take 1 tablet (600 mg total) by mouth every 6 (six) hours as needed for Pain. 40 tablet 0    ondansetron (ZOFRAN-ODT) 4 MG TbDL Take 1 tablet (4 mg total) by mouth every 6 (six) hours as needed (nausea vomiting). " "10 tablet 0     Objective:    VITAL SIGNS: 24 HR MIN & MAX LAST    Temp  Min: 99 °F (37.2 °C)  Max: 99 °F (37.2 °C)  99 °F (37.2 °C)        BP  Min: 122/83  Max: 148/88  122/83     Pulse  Min: 98  Max: 112  98     Resp  Min: 14  Max: 20  20    SpO2  Min: 87 %  Max: 95 %  95 %      HT: 6' 1" (185.4 cm)  WT: 136 kg (299 lb 13.2 oz)  BMI: 39.6       Physical Exam:  Gen: NAD, AAOx3  CV: extremities wwp, regular rate, palpable radials  Pulm: nonlabored, no increased wob, equal chest rise b/l   Abd: s/nt/nd  Wounds: Small lesion left of the anus with small amount of purulent drainage. No fluctuance to the area  MANJINDER: no fistula formation - normal exam    Results  Recent Labs   Lab 09/15/22  2121 09/18/22  2059   WBC 9.4 10.3   HGB 15.7 15.7   HCT 48.1 48.5    258    137   CHLORIDE 105 101   CO2 25 26   BUN 9.7 12.8   CREATININE 0.77 0.96   GLUCOSE 89 97   CALCIUM 9.9 9.6   ALKPHOS 82 75       Imaging:  CT Abdomen Pelvis with Contrast 9/18  1. There is extensive skin and subcutaneous thickening with mild enhancement seen posteriorly in the left medial gluteal region extending to the perianal and perineal regions, seen best on Series 8 Image 90. There's a skin defect seen overlying the same induration. Focal gas bubbles are seen within this thickening with some areas of hypodensity, suggesting impending abscess formation. without any other overt signs of collections. As compared to the previous there is marked reduction of the induration and collection within, which had been overlying the sacral region. Correlate with clinical and laboratory findings as regards additional evaluation and follow up as indicated.    A/P:   37 y.o. male presenting with perirectal pain of ~1 week duration and purulent drainage from a perirectal abscess    - Pocket drained prior to our exam, patient experienced relief of pain  - Small amount of purulent drainage remaining, wound opened with blunt dissection at bedside to promote " further drainage  - Recommend sitz baths BID  - Recommend referral for colonoscopy  - Return to ED criteria discussed with patient  - Remaining dispo per ED    Ventura Giraldo MD  [employer] General Surgery PGY-1

## 2022-09-24 LAB
BACTERIA BLD CULT: NORMAL
BACTERIA BLD CULT: NORMAL

## 2022-11-08 ENCOUNTER — OFFICE VISIT (OUTPATIENT)
Dept: GASTROENTEROLOGY | Facility: CLINIC | Age: 37
End: 2022-11-08
Payer: MEDICAID

## 2022-11-08 VITALS
DIASTOLIC BLOOD PRESSURE: 84 MMHG | TEMPERATURE: 98 F | RESPIRATION RATE: 16 BRPM | HEART RATE: 79 BPM | BODY MASS INDEX: 40.69 KG/M2 | HEIGHT: 73 IN | OXYGEN SATURATION: 96 % | SYSTOLIC BLOOD PRESSURE: 126 MMHG | WEIGHT: 307 LBS

## 2022-11-08 DIAGNOSIS — K57.32 DIVERTICULITIS OF SIGMOID COLON: ICD-10-CM

## 2022-11-08 DIAGNOSIS — K21.9 GASTROESOPHAGEAL REFLUX DISEASE, UNSPECIFIED WHETHER ESOPHAGITIS PRESENT: Primary | ICD-10-CM

## 2022-11-08 DIAGNOSIS — K61.1 PERIRECTAL ABSCESS: ICD-10-CM

## 2022-11-08 PROCEDURE — 1160F RVW MEDS BY RX/DR IN RCRD: CPT | Mod: CPTII,,, | Performed by: NURSE PRACTITIONER

## 2022-11-08 PROCEDURE — 3008F BODY MASS INDEX DOCD: CPT | Mod: CPTII,,, | Performed by: NURSE PRACTITIONER

## 2022-11-08 PROCEDURE — 99204 PR OFFICE/OUTPT VISIT, NEW, LEVL IV, 45-59 MIN: ICD-10-PCS | Mod: S$PBB,,, | Performed by: NURSE PRACTITIONER

## 2022-11-08 PROCEDURE — 1159F MED LIST DOCD IN RCRD: CPT | Mod: CPTII,,, | Performed by: NURSE PRACTITIONER

## 2022-11-08 PROCEDURE — 3079F PR MOST RECENT DIASTOLIC BLOOD PRESSURE 80-89 MM HG: ICD-10-PCS | Mod: CPTII,,, | Performed by: NURSE PRACTITIONER

## 2022-11-08 PROCEDURE — 99215 OFFICE O/P EST HI 40 MIN: CPT | Mod: PBBFAC | Performed by: NURSE PRACTITIONER

## 2022-11-08 PROCEDURE — 1160F PR REVIEW ALL MEDS BY PRESCRIBER/CLIN PHARMACIST DOCUMENTED: ICD-10-PCS | Mod: CPTII,,, | Performed by: NURSE PRACTITIONER

## 2022-11-08 PROCEDURE — 1159F PR MEDICATION LIST DOCUMENTED IN MEDICAL RECORD: ICD-10-PCS | Mod: CPTII,,, | Performed by: NURSE PRACTITIONER

## 2022-11-08 PROCEDURE — 3074F SYST BP LT 130 MM HG: CPT | Mod: CPTII,,, | Performed by: NURSE PRACTITIONER

## 2022-11-08 PROCEDURE — 3008F PR BODY MASS INDEX (BMI) DOCUMENTED: ICD-10-PCS | Mod: CPTII,,, | Performed by: NURSE PRACTITIONER

## 2022-11-08 PROCEDURE — 99204 OFFICE O/P NEW MOD 45 MIN: CPT | Mod: S$PBB,,, | Performed by: NURSE PRACTITIONER

## 2022-11-08 PROCEDURE — 3074F PR MOST RECENT SYSTOLIC BLOOD PRESSURE < 130 MM HG: ICD-10-PCS | Mod: CPTII,,, | Performed by: NURSE PRACTITIONER

## 2022-11-08 PROCEDURE — 3079F DIAST BP 80-89 MM HG: CPT | Mod: CPTII,,, | Performed by: NURSE PRACTITIONER

## 2022-11-08 RX ORDER — POLYETHYLENE GLYCOL 3350, SODIUM SULFATE, SODIUM CHLORIDE, POTASSIUM CHLORIDE, SODIUM ASCORBATE, AND ASCORBIC ACID 7.5-2.691G
2000 KIT ORAL ONCE
Qty: 1 KIT | Refills: 0 | Status: SHIPPED | OUTPATIENT
Start: 2022-11-08 | End: 2022-11-08

## 2022-11-08 RX ORDER — ESOMEPRAZOLE MAGNESIUM 40 MG/1
40 CAPSULE, DELAYED RELEASE ORAL
Qty: 30 CAPSULE | Refills: 11 | Status: SHIPPED | OUTPATIENT
Start: 2022-11-08 | End: 2022-11-10 | Stop reason: SDUPTHER

## 2022-11-08 NOTE — PROGRESS NOTES
Subjective:       Patient ID: Dilan Renteria Jr. is a 37 y.o. male.    Chief Complaint: Diverticulosis    This 37-year-old  male with a history of hypertension is referred for diverticulitis and perirectal abscess. He presents unaccompanied. He presented to the ED at [facility] September 16, 2022 with reports rectal pain for the past few weeks.  He denied constipation and reported passing normal stools.  He had discomfort with passing a bowel movement and slight relief with bowel movement.  He noted some mild left lower quadrant abdominal pain at times.  Upon arrival to ED, blood pressure and pulse were elevated and vital signs were otherwise unremarkable.  Physical exam revealed small external hemorrhoid at the 3:00 position. Laboratory results revealed unremarkable CMP; MCH 26.5, MCHC 32.6, MPV 10.6, and otherwise unremarkable CBC; urinalysis with trace protein, trace squamous epithelial, and occasional mucus.  CT scan abdomen and pelvis with contrast revealed multiple diverticula seen in the descending and sigmoid colon with focal fat stranding in the pericolonic space along the sigmoid colon at the location of the previously present diverticulitis, this suggest recurrent diverticulitis, wall of body and antrum of stomach seems thick with recommendation to assess for gastritis.  He was discharged home with Augmentin 875 mg twice daily for 10 days and recommended follow-up with GI clinic.    He presented back to the ED at [facility] September 24, 2022 with reports of abscess to buttock since the previous day.  He denied fever and reported pain 10/10 to the site despite warm compresses and Epsom salt soaks.  Upon arrival to ED, blood pressure and pulse were elevated and vital signs were otherwise unremarkable. Physical exam revealed perirectal abscess, 1.5 cm x 1.5 cm that was actively draining.  Incision and drainage was performed without complications.  Laboratory results revealed globulin 4.2 and otherwise  unremarkable CMP; .70; MCH 26.4, MCHC 32.4, MPV 10.8, and otherwise unremarkable CBC.  CT scan abdomen and pelvis with contrast revealed extensive skin and subcutaneous thickening with mild enhancement seen posteriorly in the left medial gluteal region extending to the perianal and perineal regions.  There is a skin defect seen overlying the same induration.  Focal gas bubbles are seen within this thickening with some areas of hypodensity suggesting impending abscess formation without any other overt signs of collections.  As compared to previous, there was marked reduction of the induration and collection within which had been overlying the sacral region.  He was evaluated by general surgery and recommended sitz baths with outpatient colonoscopy.    CT scan abdomen and pelvis with contrast May 21, 2020 revealed acute sigmoid diverticulitis with no evidence of an intraperitoneal fluid collection or free air.    Today, he presents for a follow-up visit. He reports improvement of abdominal pain since completing antibiotics as directed. He has intermittent symptoms of acid reflux and pyrosis described as burning in his chest and regurgitation of acid into the back of his throat. He takes OTC Nexium with some relief noted. He will occasionally awaken with acid reflux at night. His appetite is good and his weight is stable. He denies fever, chills, nausea, vomiting, hematemesis, odynophagia, dysphagia, early satiety, or abdominal pain. He has 1-2 bowel movements daily and doesn't feel completely evacuated. He denies melena, hematochezia, fecal urgency, fecal incontinence, or pain with defecation.    He denies ever having an EGD or colonoscopy done. He denies regular NSAID use or use of blood thinners. He denies tobacco or alcohol use. He smokes marijuana daily. He denies a family history of IBD, colon polyps, or colon cancer.    Review of patient's allergies indicates:  No Known Allergies    Past Medical History:    Diagnosis Date    Diverticulosis     HTN (hypertension)      History reviewed. No pertinent surgical history.    Family History:   family history is not on file.    Social History:    reports that he has never smoked. He has never used smokeless tobacco. He reports that he does not currently use alcohol. He reports current drug use. Frequency: 7.00 times per week.    Review of Systems  Negative except as noted in the HPI.      Objective:      Physical Exam  Constitutional:       Appearance: Normal appearance.   HENT:      Head: Normocephalic.      Mouth/Throat:      Mouth: Mucous membranes are moist.   Eyes:      Extraocular Movements: Extraocular movements intact.      Conjunctiva/sclera: Conjunctivae normal.      Pupils: Pupils are equal, round, and reactive to light.   Cardiovascular:      Rate and Rhythm: Normal rate and regular rhythm.      Pulses: Normal pulses.      Heart sounds: Normal heart sounds.   Pulmonary:      Effort: Pulmonary effort is normal.      Breath sounds: Normal breath sounds.   Abdominal:      General: Bowel sounds are normal.      Palpations: Abdomen is soft.   Musculoskeletal:         General: Normal range of motion.      Cervical back: Normal range of motion and neck supple.   Skin:     General: Skin is warm and dry.   Neurological:      General: No focal deficit present.      Mental Status: He is alert and oriented to person, place, and time.   Psychiatric:         Mood and Affect: Mood normal.         Behavior: Behavior normal.         Thought Content: Thought content normal.         Judgment: Judgment normal.       Home Medications:     Current Outpatient Medications   Medication Sig    ALPRAZolam (XANAX) 0.5 MG tablet Take 0.5 mg by mouth every evening.    amLODIPine (NORVASC) 10 MG tablet Take 10 mg by mouth once daily.    atenoloL (TENORMIN) 25 MG tablet Take 25 mg by mouth once daily.     Laboratory Results:     Recent Results (from the past 2016 hour(s))   Comprehensive  Metabolic Panel    Collection Time: 09/15/22  9:21 PM   Result Value Ref Range    Sodium Level 139 136 - 145 mmol/L    Potassium Level 3.8 3.5 - 5.1 mmol/L    Chloride 105 98 - 107 mmol/L    Carbon Dioxide 25 22 - 29 mmol/L    Glucose Level 89 74 - 100 mg/dL    Blood Urea Nitrogen 9.7 8.9 - 20.6 mg/dL    Creatinine 0.77 0.73 - 1.18 mg/dL    Calcium Level Total 9.9 8.4 - 10.2 mg/dL    Protein Total 7.9 6.4 - 8.3 gm/dL    Albumin Level 4.2 3.5 - 5.0 gm/dL    Globulin 3.7 (H) 2.4 - 3.5 gm/dL    Albumin/Globulin Ratio 1.1 1.1 - 2.0 ratio    Bilirubin Total 0.4 <=1.5 mg/dL    Alkaline Phosphatase 82 40 - 150 unit/L    Alanine Aminotransferase 33 0 - 55 unit/L    Aspartate Aminotransferase 19 5 - 34 unit/L    eGFR >60 mls/min/1.73/m2   CBC with Differential    Collection Time: 09/15/22  9:21 PM   Result Value Ref Range    WBC 9.4 4.5 - 11.5 x10(3)/mcL    RBC 5.93 4.70 - 6.10 x10(6)/mcL    Hgb 15.7 14.0 - 18.0 gm/dL    Hct 48.1 42.0 - 52.0 %    MCV 81.1 80.0 - 94.0 fL    MCH 26.5 (L) 27.0 - 31.0 pg    MCHC 32.6 (L) 33.0 - 36.0 mg/dL    RDW 13.2 11.5 - 17.0 %    Platelet 270 130 - 400 x10(3)/mcL    MPV 10.6 (H) 7.4 - 10.4 fL    Neut % 61.3 %    Lymph % 32.3 %    Mono % 5.0 %    Eos % 0.7 %    Basophil % 0.5 %    Lymph # 3.03 0.6 - 4.6 x10(3)/mcL    Neut # 5.7 2.1 - 9.2 x10(3)/mcL    Mono # 0.47 0.1 - 1.3 x10(3)/mcL    Eos # 0.07 0 - 0.9 x10(3)/mcL    Baso # 0.05 0 - 0.2 x10(3)/mcL    IG# 0.02 0 - 0.04 x10(3)/mcL    IG% 0.2 %    NRBC% 0.0 %   Urinalysis, Reflex to Urine Culture Urine, Clean Catch    Collection Time: 09/15/22 10:19 PM    Specimen: Urine   Result Value Ref Range    Color, UA Yellow Yellow, Colorless, Other, Clear    Appearance, UA Clear Clear    Specific Gravity, UA 1.027     pH, UA 6.5 5.0, 5.5, 6.0, 6.5, 7.0, 7.5, 8.0, 8.5    Protein, UA Trace (A) Negative, 300  mg/dL    Glucose, UA Normal Negative, Normal mg/dL    Ketones, UA Negative Negative, +1, +2, +3, +4, +5, >=160, >=80 mg/dL    Blood, UA Negative  Negative unit/L    Bilirubin, UA Negative Negative mg/dL    Urobilinogen, UA Normal 0.2, 1.0, Normal mg/dL    Nitrites, UA Negative Negative    Leukocyte Esterase, UA Negative Negative, 75  unit/L    WBC, UA 0-5 None Seen, 0-2, 3-5, 0-5 /HPF    Bacteria, UA None Seen None Seen /HPF    Squamous Epithelial Cells, UA Trace (A) None Seen /HPF    Mucous, UA Occ (A) None Seen /LPF    Hyaline Casts, UA None Seen None Seen /lpf    RBC, UA 0-5 None Seen, 0-2, 3-5, 0-5 /HPF   Comprehensive Metabolic Panel    Collection Time: 09/18/22  8:59 PM   Result Value Ref Range    Sodium Level 137 136 - 145 mmol/L    Potassium Level 3.6 3.5 - 5.1 mmol/L    Chloride 101 98 - 107 mmol/L    Carbon Dioxide 26 22 - 29 mmol/L    Glucose Level 97 74 - 100 mg/dL    Blood Urea Nitrogen 12.8 8.9 - 20.6 mg/dL    Creatinine 0.96 0.73 - 1.18 mg/dL    Calcium Level Total 9.6 8.4 - 10.2 mg/dL    Protein Total 8.1 6.4 - 8.3 gm/dL    Albumin Level 3.9 3.5 - 5.0 gm/dL    Globulin 4.2 (H) 2.4 - 3.5 gm/dL    Albumin/Globulin Ratio 0.9 (L) 1.1 - 2.0 ratio    Bilirubin Total 0.4 <=1.5 mg/dL    Alkaline Phosphatase 75 40 - 150 unit/L    Alanine Aminotransferase 38 0 - 55 unit/L    Aspartate Aminotransferase 26 5 - 34 unit/L    eGFR >60 mls/min/1.73/m2   C-Reactive Protein    Collection Time: 09/18/22  8:59 PM   Result Value Ref Range    C-Reactive Protein 136.70 (H) <5.00 mg/L   Lactic Acid, Plasma    Collection Time: 09/18/22  8:59 PM   Result Value Ref Range    Lactic Acid Level 1.7 0.5 - 2.2 mmol/L   CBC with Differential    Collection Time: 09/18/22  8:59 PM   Result Value Ref Range    WBC 10.3 4.5 - 11.5 x10(3)/mcL    RBC 5.94 4.70 - 6.10 x10(6)/mcL    Hgb 15.7 14.0 - 18.0 gm/dL    Hct 48.5 42.0 - 52.0 %    MCV 81.6 80.0 - 94.0 fL    MCH 26.4 (L) 27.0 - 31.0 pg    MCHC 32.4 (L) 33.0 - 36.0 mg/dL    RDW 12.9 11.5 - 17.0 %    Platelet 258 130 - 400 x10(3)/mcL    MPV 10.8 (H) 7.4 - 10.4 fL    Neut % 79.8 %    Lymph % 12.7 %    Mono % 6.4 %    Eos % 0.1 %     Basophil % 0.5 %    Lymph # 1.30 0.6 - 4.6 x10(3)/mcL    Neut # 8.2 2.1 - 9.2 x10(3)/mcL    Mono # 0.66 0.1 - 1.3 x10(3)/mcL    Eos # 0.01 0 - 0.9 x10(3)/mcL    Baso # 0.05 0 - 0.2 x10(3)/mcL    IG# 0.05 (H) 0 - 0.04 x10(3)/mcL    IG% 0.5 %    NRBC% 0.0 %   Blood Culture    Collection Time: 09/18/22  9:21 PM    Specimen: Blood   Result Value Ref Range    CULTURE, BLOOD ([facility]) No Growth at 5 days    Blood Culture    Collection Time: 09/18/22  9:21 PM    Specimen: Blood   Result Value Ref Range    CULTURE, BLOOD ([facility]) No Growth at 5 days      Imaging Results:     Narrative & Impression  EXAMINATION:  CT ABDOMEN PELVIS WITH CONTRAST     CLINICAL HISTORY:  LLQ abdominal pain and rectal pain;     TECHNIQUE:  CT imaging of the abdomen and pelvis after the administration of 100 mL of Omnipaque 350 intravenous contrast. Dose length product is 890 mGycm. Automatic exposure control, adjustment of mA/kV or iterative reconstruction technique used to limit radiation dose.     COMPARISON:  CT 05/21/2020     FINDINGS:  Liver/biliary: Stable millimetric hypodensity in the hepatic dome, statistically benign.  No radiodense gallstones. No intra or extrahepatic biliary ductal dilation.     Pancreas: Normal.     Spleen: Normal.     Adrenals: Normal.     Genitourinary: Symmetric renal enhancement. No hydronephrosis. Bladder within normal limits. Normal prostate size.     Stomach/bowel: No evidence of bowel obstruction. Normal appendix. Distal colonic diverticulosis.  Very mild stranding adjacent to a short segment of sigmoid colon (best seen on series 6, images 73 through 77).     Lymph nodes: No pathologically enlarged lymph node identified.     Peritoneum: No ascites or free air. No fluid collection.     Vasculature: Normal abdominal aortic caliber.     Abdominal wall: Tiny fat containing umbilical hernia.     Lung bases: No consolidation or pleural effusion.     Musculoskeletal: No acute osseous findings.     Impression:     Very mild  stranding adjacent to a short segment of sigmoid colon with underlying diverticulosis, suggestive of mild diverticulitis.  No perforation or abscess identified.     No significant discrepancy between my interpretation and the preliminary radiology report.        Electronically signed by: Peewee Hood  Date:                                            09/16/2022  Time:                                           08:51        Narrative & Impression  EXAMINATION:  CT ABDOMEN PELVIS WITH CONTRAST     CLINICAL HISTORY:  diverticulitis with perirectal abscess, concerns for fistula abscess;     TECHNIQUE:  CT imaging of the abdomen and pelvis after the administration of 100 mL of Omnipaque 350 intravenous contrast. Dose length product is 962 mGycm. Automatic exposure control, adjustment of mA/kV or iterative reconstruction technique used to limit radiation dose.     COMPARISON:  CT 09/15/2022     FINDINGS:  Liver/biliary: Mild generalized hepatic steatosis.  Subcentimeter hypodensity in the hepatic dome remains too small to characterize, but is statistically benign.  No radiodense gallstones. No intra or extrahepatic biliary ductal dilation.     Pancreas: Normal.     Spleen: Normal.     Adrenals: Normal.     Genitourinary: Symmetric renal enhancement. No hydronephrosis. Bladder within normal limits. Normal prostate size.     Stomach/bowel: No evidence of bowel obstruction. Normal appendix. Mild inflammatory changes seen in the left perianal region and inferior portion of the left perineum. No defined rim enhancing fluid collection identified in this region.  Mild stranding again identified along a short segment of sigmoid colon, similar to the prior CT.     Lymph nodes: Few reactive appearing left inguinal lymph nodes.     Peritoneum: No ascites or pneumoperitoneum.     Vasculature: Normal abdominal aortic caliber.     Abdominal wall: Normal.     Lung bases: No consolidation or pleural effusion.     Musculoskeletal: No acute  osseous findings.     Impression:     1. Left perianal and perineal inflammation.  No defined abscess identified at this time.  2. Possible mild sigmoid diverticulitis, similar to the recent prior CT.  No major discrepancy between my interpretation and the preliminary radiology report.        Electronically signed by: Peewee Hood  Date:                                            09/19/2022  Time:                                           08:44    Assessment/Plan:     Problem List Items Addressed This Visit          GI    Diverticulitis of sigmoid colon     Laboratory results September 16, 2022 during ED visit revealed unremarkable CMP; MCH 26.5, MCHC 32.6, MPV 10.6, and otherwise unremarkable CBC; urinalysis with trace protein, trace squamous epithelial, and occasional mucus.    CT scan abdomen and pelvis with contrast revealed multiple diverticula seen in the descending and sigmoid colon with focal fat stranding in the pericolonic space along the sigmoid colon at the location of the previously present diverticulitis, this suggest recurrent diverticulitis, wall of body and antrum of stomach seems thick with recommendation to assess for gastritis.    He was treated with Augmentin 875 mg twice daily for 10 days.  Physical exam during ED visit September 24, 2022 revealed perirectal abscess, 1.5 cm x 1.5 cm that was actively draining.  Incision and drainage was performed without complications.   Laboratory results revealed globulin 4.2 and otherwise unremarkable CMP; .70; MCH 26.4, MCHC 32.4, MPV 10.8, and otherwise unremarkable CBC.    CT scan abdomen and pelvis with contrast revealed extensive skin and subcutaneous thickening with mild enhancement seen posteriorly in the left medial gluteal region extending to the perianal and perineal regions.  There is a skin defect seen overlying the same induration.  Focal gas bubbles are seen within this thickening with some areas of hypodensity suggesting impending  abscess formation without any other overt signs of collections.  As compared to previous, there was marked reduction of the induration and collection within which had been overlying the sacral region.    He was evaluated by general surgery and recommended sitz baths with outpatient colonoscopy.  CT scan abdomen and pelvis with contrast May 21, 2020 revealed acute sigmoid diverticulitis with no evidence of an intraperitoneal fluid collection or free air.  Recommend soluble fiber supplementation  Recommend daily probiotic  Avoid straining or sitting on the toilet for long periods of time  Colonoscopy  Call with updates  ER precautions provided         Perirectal abscess     See above         Gastroesophageal reflux disease - Primary     CT scan abdomen and pelvis with contrast September 16, 2022 revealed multiple diverticula seen in the descending and sigmoid colon with focal fat stranding in the pericolonic space along the sigmoid colon at the location of the previously present diverticulitis, this suggest recurrent diverticulitis, wall of body and antrum of stomach seems thick with recommendation to assess for gastritis.   GERD lifestyle modifications  Reflux precautions  Avoid NSAID use  EGD  Nexium 40 mg daily  Call with updates          Relevant Medications    esomeprazole (NEXIUM) 40 MG capsule

## 2022-11-08 NOTE — ASSESSMENT & PLAN NOTE
CT scan abdomen and pelvis with contrast September 16, 2022 revealed multiple diverticula seen in the descending and sigmoid colon with focal fat stranding in the pericolonic space along the sigmoid colon at the location of the previously present diverticulitis, this suggest recurrent diverticulitis, wall of body and antrum of stomach seems thick with recommendation to assess for gastritis.   GERD lifestyle modifications  Reflux precautions  Avoid NSAID use  EGD  Nexium 40 mg daily  Call with updates

## 2022-11-08 NOTE — ASSESSMENT & PLAN NOTE
Laboratory results September 16, 2022 during ED visit revealed unremarkable CMP; MCH 26.5, MCHC 32.6, MPV 10.6, and otherwise unremarkable CBC; urinalysis with trace protein, trace squamous epithelial, and occasional mucus.    CT scan abdomen and pelvis with contrast revealed multiple diverticula seen in the descending and sigmoid colon with focal fat stranding in the pericolonic space along the sigmoid colon at the location of the previously present diverticulitis, this suggest recurrent diverticulitis, wall of body and antrum of stomach seems thick with recommendation to assess for gastritis.    He was treated with Augmentin 875 mg twice daily for 10 days.  Physical exam during ED visit September 24, 2022 revealed perirectal abscess, 1.5 cm x 1.5 cm that was actively draining.  Incision and drainage was performed without complications.   Laboratory results revealed globulin 4.2 and otherwise unremarkable CMP; .70; MCH 26.4, MCHC 32.4, MPV 10.8, and otherwise unremarkable CBC.    CT scan abdomen and pelvis with contrast revealed extensive skin and subcutaneous thickening with mild enhancement seen posteriorly in the left medial gluteal region extending to the perianal and perineal regions.  There is a skin defect seen overlying the same induration.  Focal gas bubbles are seen within this thickening with some areas of hypodensity suggesting impending abscess formation without any other overt signs of collections.  As compared to previous, there was marked reduction of the induration and collection within which had been overlying the sacral region.    He was evaluated by general surgery and recommended sitz baths with outpatient colonoscopy.  CT scan abdomen and pelvis with contrast May 21, 2020 revealed acute sigmoid diverticulitis with no evidence of an intraperitoneal fluid collection or free air.  Recommend soluble fiber supplementation  Recommend daily probiotic  Avoid straining or sitting on the toilet  for long periods of time  Colonoscopy  Call with updates  ER precautions provided

## 2022-11-10 DIAGNOSIS — K21.9 GASTROESOPHAGEAL REFLUX DISEASE, UNSPECIFIED WHETHER ESOPHAGITIS PRESENT: ICD-10-CM

## 2022-11-10 NOTE — TELEPHONE ENCOUNTER
Insurance denied coverage of Esomeprazole. Advised pt that 90D @ [facility] pharmacy will be $13.65. Pt requests Rx be sent to [facility].

## 2022-11-11 RX ORDER — ESOMEPRAZOLE MAGNESIUM 40 MG/1
40 CAPSULE, DELAYED RELEASE ORAL
Qty: 90 CAPSULE | Refills: 3 | Status: SHIPPED | OUTPATIENT
Start: 2022-11-11 | End: 2023-11-11

## 2023-01-16 ENCOUNTER — ANESTHESIA EVENT (OUTPATIENT)
Dept: ENDOSCOPY | Facility: HOSPITAL | Age: 38
End: 2023-01-16
Payer: MEDICAID

## 2023-01-16 NOTE — ANESTHESIA PREPROCEDURE EVALUATION
01/16/2023  Dilan Renteria Jr. is a 37 y.o., male. For CLN and EGD    History of perirectal abscess, Diverticulitis, GERD, HTN (LD BB @  1/17/23 @ 2200   ), Anxiety    History reviewed. No pertinent surgical history.    Lab Results   Component Value Date    WBC 10.3 09/18/2022    HGB 15.7 09/18/2022    HCT 48.5 09/18/2022     09/18/2022    ALT 38 09/18/2022    AST 26 09/18/2022     09/18/2022    K 3.6 09/18/2022    CREATININE 0.96 09/18/2022    BUN 12.8 09/18/2022    CO2 26 09/18/2022       Pre-op Assessment    I have reviewed the NPO Status.      Review of Systems  Anesthesia Hx:  No previous Anesthesia    Social:  Smoker    Cardiovascular:   Hypertension    Pulmonary:  Pulmonary Normal    Renal/:  Renal/ Normal     Hepatic/GI:   GERD, well controlled    Neurological:  Neurology Normal    Endocrine:  Morbid Obesity / BMI > 40  Psych:   anxiety          Physical Exam  General: Alert, Cooperative and Well nourished    Airway:  Mallampati: II   Mouth Opening: Normal  TM Distance: Normal  Tongue: Normal  Neck ROM: Normal ROM    Dental:  Intact    Chest/Lungs:  Normal Respiratory Rate, Clear to auscultation    Heart:  Rate: Normal  Rhythm: Regular Rhythm  Sounds: Normal        Anesthesia Plan  Type of Anesthesia, risks & benefits discussed:    Anesthesia Type: Gen Natural Airway  Intra-op Monitoring Plan: Standard ASA Monitors  Post Op Pain Control Plan: IV/PO Opioids PRN  Induction:  IV  Informed Consent: Informed consent signed with the Patient and all parties understand the risks and agree with anesthesia plan.  All questions answered. Patient consented to blood products? No  ASA Score: 3  Day of Surgery Review of History & Physical: H&P Update referred to the surgeon/provider.    Ready For Surgery From Anesthesia Perspective.     .

## 2023-01-18 ENCOUNTER — ANESTHESIA (OUTPATIENT)
Dept: ENDOSCOPY | Facility: HOSPITAL | Age: 38
End: 2023-01-18
Payer: MEDICAID

## 2023-01-18 ENCOUNTER — HOSPITAL ENCOUNTER (OUTPATIENT)
Facility: HOSPITAL | Age: 38
Discharge: HOME | End: 2023-01-18
Attending: INTERNAL MEDICINE | Admitting: INTERNAL MEDICINE
Payer: MEDICAID

## 2023-01-18 DIAGNOSIS — K21.9 GASTROESOPHAGEAL REFLUX DISEASE WITHOUT ESOPHAGITIS: Primary | ICD-10-CM

## 2023-01-18 DIAGNOSIS — K21.9 GASTROESOPHAGEAL REFLUX DISEASE, UNSPECIFIED WHETHER ESOPHAGITIS PRESENT: ICD-10-CM

## 2023-01-18 DIAGNOSIS — K61.1 PERIRECTAL ABSCESS: ICD-10-CM

## 2023-01-18 DIAGNOSIS — K57.32 DIVERTICULITIS OF SIGMOID COLON: ICD-10-CM

## 2023-01-18 PROCEDURE — 37000008 HC ANESTHESIA 1ST 15 MINUTES: Performed by: INTERNAL MEDICINE

## 2023-01-18 PROCEDURE — 25000003 PHARM REV CODE 250: Performed by: INTERNAL MEDICINE

## 2023-01-18 PROCEDURE — 45385 COLONOSCOPY W/LESION REMOVAL: CPT | Performed by: INTERNAL MEDICINE

## 2023-01-18 PROCEDURE — 63600175 PHARM REV CODE 636 W HCPCS: Performed by: NURSE ANESTHETIST, CERTIFIED REGISTERED

## 2023-01-18 PROCEDURE — 88305 TISSUE EXAM BY PATHOLOGIST: CPT | Performed by: INTERNAL MEDICINE

## 2023-01-18 PROCEDURE — 63600175 PHARM REV CODE 636 W HCPCS: Performed by: SPECIALIST

## 2023-01-18 PROCEDURE — 25000003 PHARM REV CODE 250: Performed by: NURSE ANESTHETIST, CERTIFIED REGISTERED

## 2023-01-18 PROCEDURE — 27201423 OPTIME MED/SURG SUP & DEVICES STERILE SUPPLY: Performed by: INTERNAL MEDICINE

## 2023-01-18 PROCEDURE — 37000009 HC ANESTHESIA EA ADD 15 MINS: Performed by: INTERNAL MEDICINE

## 2023-01-18 PROCEDURE — 00813 ANES UPR LWR GI NDSC PX: CPT | Performed by: INTERNAL MEDICINE

## 2023-01-18 RX ORDER — SODIUM CHLORIDE, SODIUM LACTATE, POTASSIUM CHLORIDE, CALCIUM CHLORIDE 600; 310; 30; 20 MG/100ML; MG/100ML; MG/100ML; MG/100ML
INJECTION, SOLUTION INTRAVENOUS CONTINUOUS
Status: DISCONTINUED | OUTPATIENT
Start: 2023-01-18 | End: 2023-01-18 | Stop reason: HOSPADM

## 2023-01-18 RX ORDER — PROPOFOL 10 MG/ML
VIAL (ML) INTRAVENOUS
Status: DISCONTINUED | OUTPATIENT
Start: 2023-01-18 | End: 2023-01-18

## 2023-01-18 RX ORDER — LIDOCAINE HYDROCHLORIDE 20 MG/ML
INJECTION, SOLUTION EPIDURAL; INFILTRATION; INTRACAUDAL; PERINEURAL
Status: DISCONTINUED | OUTPATIENT
Start: 2023-01-18 | End: 2023-01-18

## 2023-01-18 RX ORDER — LIDOCAINE HYDROCHLORIDE 20 MG/ML
SOLUTION OROPHARYNGEAL
Status: COMPLETED | OUTPATIENT
Start: 2023-01-18 | End: 2023-01-18

## 2023-01-18 RX ADMIN — PROPOFOL 30 MG: 10 INJECTION, EMULSION INTRAVENOUS at 12:01

## 2023-01-18 RX ADMIN — LIDOCAINE HYDROCHLORIDE 60 MG: 20 INJECTION, SOLUTION INTRAVENOUS at 12:01

## 2023-01-18 RX ADMIN — PROPOFOL 20 MG: 10 INJECTION, EMULSION INTRAVENOUS at 12:01

## 2023-01-18 RX ADMIN — PROPOFOL 40 MG: 10 INJECTION, EMULSION INTRAVENOUS at 12:01

## 2023-01-18 RX ADMIN — SODIUM CHLORIDE, POTASSIUM CHLORIDE, SODIUM LACTATE AND CALCIUM CHLORIDE: 600; 310; 30; 20 INJECTION, SOLUTION INTRAVENOUS at 11:01

## 2023-01-18 RX ADMIN — PROPOFOL 110 MG: 10 INJECTION, EMULSION INTRAVENOUS at 12:01

## 2023-01-18 RX ADMIN — PROPOFOL 50 MG: 10 INJECTION, EMULSION INTRAVENOUS at 12:01

## 2023-01-18 NOTE — PROVATION PATIENT INSTRUCTIONS
Discharge Summary/Instructions after an Endoscopic Procedure  Patient Name: Dilan Renteria  Patient MRN: [id number]  Patient YOB: 1985 Wednesday, January 18, 2023  Aliza Pulido MD  Dear patient,  As a result of recent federal legislation (The Federal Cures Act), you may   receive lab or pathology results from your procedure in your [facility]   account before your physician is able to contact you. Your physician or   their representative will relay the results to you with their   recommendations at their soonest availability.  Thank you,  RESTRICTIONS:  During your procedure today, you received medications for sedation.  These   medications may affect your judgment, balance and coordination.  Therefore,   for 24 hours, you have the following restrictions:   - DO NOT drive a car, operate machinery, make legal/financial decisions,   sign important papers or drink alcohol.    ACTIVITY:  Today: no heavy lifting, straining or running due to procedural   sedation/anesthesia.  The following day: return to full activity including work.  DIET:  Eat and drink normally unless instructed otherwise.     TREATMENT FOR COMMON SIDE EFFECTS:  - Mild abdominal pain, nausea, belching, bloating or excessive gas:  rest,   eat lightly and use a heating pad.  - Sore Throat: treat with throat lozenges and/or gargle with warm salt   water.  - Because air was used during the procedure, expelling large amounts of air   from your rectum or belching is normal.  - If a bowel prep was taken, you may not have a bowel movement for 1-3 days.    This is normal.  SYMPTOMS TO WATCH FOR AND REPORT TO YOUR PHYSICIAN:  1. Abdominal pain or bloating, other than gas cramps.  2. Chest pain.  3. Back pain.  4. Signs of infection such as: chills or fever occurring within 24 hours   after the procedure.  5. Rectal bleeding, which would show as bright red, maroon, or black stools.   (A tablespoon of blood from the rectum is not serious,  especially if   hemorrhoids are present.)  6. Vomiting.  7. Weakness or dizziness.  GO DIRECTLY TO THE NEAREST EMERGENCY ROOM IF YOU HAVE ANY OF THE FOLLOWING:      Difficulty breathing              Chills and/or fever over 101 F   Persistent vomiting and/or vomiting blood   Severe abdominal pain   Severe chest pain   Black, tarry stools   Bleeding- more than one tablespoon   Any other symptom or condition that you feel may need urgent attention  Your doctor recommends these additional instructions:  If any biopsies were taken, your doctors clinic will contact you in 1 to 2   weeks with any results.  - Patient has a contact number available for emergencies.  The signs and   symptoms of potential delayed complications were discussed with the   patient.  Return to normal activities tomorrow.  Written discharge   instructions were provided to the patient.   - Discharge patient to home.   - High fiber diet.   - Continue present medications.   - Await pathology results.   - Repeat colonoscopy is recommended for screening purposes.  The colonoscopy   date will be determined after pathology results from today's exam become   available for review.  For questions, problems or results please call your physician - Aliza Pulido MD at Work:  [phone], Work:  [phone].  [facility] , EMERGENCY ROOM PHONE NUMBER: [phone]  IF A COMPLICATION OR EMERGENCY SITUATION ARISES AND YOU ARE UNABLE TO REACH   YOUR PHYSICIAN - GO DIRECTLY TO THE EMERGENCY ROOM.  MD Aliza Lanza MD  1/18/2023 12:51:44 PM  This report has been verified and signed electronically.  Dear patient,  As a result of recent federal legislation (The Federal Cures Act), you may   receive lab or pathology results from your procedure in your [facility]   account before your physician is able to contact you. Your physician or   their representative will relay the results to you with their    recommendations at their soonest availability.  Thank you,  PROVATION

## 2023-01-18 NOTE — TRANSFER OF CARE
"Anesthesia Transfer of Care Note    Patient: Dilan Renteria Jr.    Procedure(s) Performed: Procedure(s) (LRB):  EGD (ESOPHAGOGASTRODUODENOSCOPY) (N/A)  COLONOSCOPY (N/A)    Patient location: GI    Anesthesia Type: general    Post pain: adequate analgesia    Post assessment: no apparent anesthetic complications    Post vital signs: stable    Level of consciousness: sedated    Nausea/Vomiting: no nausea/vomiting    Complications: none    Transfer of care protocol was followed      Last vitals:   Visit Vitals  BP (!) 141/100 (BP Location: Left arm, Patient Position: Lying)   Pulse 86   Temp 36.6 °C (97.9 °F) (Oral)   Resp 20   Ht 6' 1" (1.854 m)   Wt (!) 138.4 kg (305 lb 1.9 oz)   SpO2 100%   BMI 40.26 kg/m²     "

## 2023-01-18 NOTE — ANESTHESIA POSTPROCEDURE EVALUATION
Anesthesia Post Evaluation    Patient: Dilan Renteria Jr.    Procedure(s) Performed: Procedure(s) (LRB):  EGD (ESOPHAGOGASTRODUODENOSCOPY) (N/A)  COLONOSCOPY (N/A)    Final Anesthesia Type: general      Patient location during evaluation: GI PACU  Patient participation: Yes- Able to Participate  Level of consciousness: awake and alert  Post-procedure vital signs: reviewed and stable  Pain management: adequate  Airway patency: patent  CRISTINA mitigation strategies: Preoperative initiation of continuous positive airway pressure (CPAP) or non-invasive positive pressure ventilation (NIPPV)  PONV status at discharge: No PONV  Anesthetic complications: no      Cardiovascular status: hemodynamically stable  Respiratory status: unassisted, room air and spontaneous ventilation  Hydration status: euvolemic  Follow-up not needed.          Vitals Value Taken Time   /100 01/18/23 1109   Temp 36.6 °C (97.9 °F) 01/18/23 1109   Pulse 86 01/18/23 1109   Resp 20 01/18/23 1109   SpO2 100 % 01/18/23 1109         No case tracking events are documented in the log.      Pain/Ozzie Score: No data recorded

## 2023-01-18 NOTE — PROVATION PATIENT INSTRUCTIONS
Discharge Summary/Instructions after an Endoscopic Procedure  Patient Name: Dilan Renteria  Patient MRN: [id number]  Patient YOB: 1985 Wednesday, January 18, 2023  Aliza Pulido MD  Dear patient,  As a result of recent federal legislation (The Federal Cures Act), you may   receive lab or pathology results from your procedure in your [facility]   account before your physician is able to contact you. Your physician or   their representative will relay the results to you with their   recommendations at their soonest availability.  Thank you,  RESTRICTIONS:  During your procedure today, you received medications for sedation.  These   medications may affect your judgment, balance and coordination.  Therefore,   for 24 hours, you have the following restrictions:   - DO NOT drive a car, operate machinery, make legal/financial decisions,   sign important papers or drink alcohol.    ACTIVITY:  Today: no heavy lifting, straining or running due to procedural   sedation/anesthesia.  The following day: return to full activity including work.  DIET:  Eat and drink normally unless instructed otherwise.     TREATMENT FOR COMMON SIDE EFFECTS:  - Mild abdominal pain, nausea, belching, bloating or excessive gas:  rest,   eat lightly and use a heating pad.  - Sore Throat: treat with throat lozenges and/or gargle with warm salt   water.  - Because air was used during the procedure, expelling large amounts of air   from your rectum or belching is normal.  - If a bowel prep was taken, you may not have a bowel movement for 1-3 days.    This is normal.  SYMPTOMS TO WATCH FOR AND REPORT TO YOUR PHYSICIAN:  1. Abdominal pain or bloating, other than gas cramps.  2. Chest pain.  3. Back pain.  4. Signs of infection such as: chills or fever occurring within 24 hours   after the procedure.  5. Rectal bleeding, which would show as bright red, maroon, or black stools.   (A tablespoon of blood from the rectum is not serious,  especially if   hemorrhoids are present.)  6. Vomiting.  7. Weakness or dizziness.  GO DIRECTLY TO THE NEAREST EMERGENCY ROOM IF YOU HAVE ANY OF THE FOLLOWING:      Difficulty breathing              Chills and/or fever over 101 F   Persistent vomiting and/or vomiting blood   Severe abdominal pain   Severe chest pain   Black, tarry stools   Bleeding- more than one tablespoon   Any other symptom or condition that you feel may need urgent attention  Your doctor recommends these additional instructions:  If any biopsies were taken, your doctors clinic will contact you in 1 to 2   weeks with any results.  - Patient has a contact number available for emergencies.  The signs and   symptoms of potential delayed complications were discussed with the   patient.  Return to normal activities tomorrow.  Written discharge   instructions were provided to the patient.   - Discharge patient to home.   - Continue present medications, Nexium 40 mg daily.   - Await pathology results.   - GERD prevention diet.   - Follow an antireflux regimen.  For questions, problems or results please call your physician - Aliza Pulido MD at Work:  [phone], Work:  [phone].  [facility] , EMERGENCY ROOM PHONE NUMBER: [phone]  IF A COMPLICATION OR EMERGENCY SITUATION ARISES AND YOU ARE UNABLE TO REACH   YOUR PHYSICIAN - GO DIRECTLY TO THE EMERGENCY ROOM.  MD Aliza Lanza MD  1/18/2023 12:55:58 PM  This report has been verified and signed electronically.  Dear patient,  As a result of recent federal legislation (The Federal Cures Act), you may   receive lab or pathology results from your procedure in your [facility]   account before your physician is able to contact you. Your physician or   their representative will relay the results to you with their   recommendations at their soonest availability.  Thank you,  PROVATION

## 2023-01-19 LAB
ESTROGEN SERPL-MCNC: NORMAL PG/ML
INSULIN SERPL-ACNC: NORMAL U[IU]/ML
LAB AP CLINICAL INFORMATION: NORMAL
LAB AP GROSS DESCRIPTION: NORMAL
LAB AP REPORT FOOTNOTES: NORMAL
T3RU NFR SERPL: NORMAL %

## 2023-01-20 VITALS
TEMPERATURE: 98 F | WEIGHT: 305.13 LBS | BODY MASS INDEX: 40.44 KG/M2 | RESPIRATION RATE: 20 BRPM | OXYGEN SATURATION: 99 % | DIASTOLIC BLOOD PRESSURE: 86 MMHG | HEIGHT: 73 IN | HEART RATE: 72 BPM | SYSTOLIC BLOOD PRESSURE: 133 MMHG

## 2023-12-07 ENCOUNTER — HOSPITAL ENCOUNTER (EMERGENCY)
Facility: HOSPITAL | Age: 38
Discharge: HOME | End: 2023-12-07
Attending: EMERGENCY MEDICINE
Payer: MEDICAID

## 2023-12-07 VITALS
OXYGEN SATURATION: 99 % | HEIGHT: 73 IN | TEMPERATURE: 96 F | RESPIRATION RATE: 20 BRPM | DIASTOLIC BLOOD PRESSURE: 91 MMHG | WEIGHT: 306.44 LBS | HEART RATE: 67 BPM | SYSTOLIC BLOOD PRESSURE: 141 MMHG | BODY MASS INDEX: 40.61 KG/M2

## 2023-12-07 DIAGNOSIS — J02.9 PHARYNGITIS, UNSPECIFIED ETIOLOGY: Primary | ICD-10-CM

## 2023-12-07 DIAGNOSIS — J30.9 ALLERGIC RHINITIS, UNSPECIFIED SEASONALITY, UNSPECIFIED TRIGGER: ICD-10-CM

## 2023-12-07 LAB
FLUAV AG UPPER RESP QL IA.RAPID: NOT DETECTED
FLUBV AG UPPER RESP QL IA.RAPID: NOT DETECTED
RSV A 5' UTR RNA NPH QL NAA+PROBE: NOT DETECTED
SARS-COV-2 RNA RESP QL NAA+PROBE: NOT DETECTED
STREP A PCR (OHS): NOT DETECTED

## 2023-12-07 PROCEDURE — 0241U COVID/RSV/FLU A&B PCR: CPT | Performed by: PHYSICIAN ASSISTANT

## 2023-12-07 PROCEDURE — 87651 STREP A DNA AMP PROBE: CPT | Performed by: PHYSICIAN ASSISTANT

## 2023-12-07 PROCEDURE — 99282 EMERGENCY DEPT VISIT SF MDM: CPT

## 2023-12-07 RX ORDER — CETIRIZINE HYDROCHLORIDE 10 MG/1
10 TABLET ORAL DAILY
Qty: 30 TABLET | Refills: 0 | Status: SHIPPED | OUTPATIENT
Start: 2023-12-07 | End: 2024-12-06

## 2023-12-07 RX ORDER — FLUTICASONE PROPIONATE 50 MCG
1 SPRAY, SUSPENSION (ML) NASAL 2 TIMES DAILY PRN
Qty: 15 G | Refills: 0 | Status: SHIPPED | OUTPATIENT
Start: 2023-12-07

## 2023-12-07 NOTE — ED PROVIDER NOTES
Encounter Date: 12/7/2023       History     Chief Complaint   Patient presents with    Sore Throat    Sinusitis     C/o sore throat, sinusitis x 3 days     Dilan Renteria Jr. is a 38 y.o. male who presents to the ED with complaints of nasal congestion, nasal discharge, and sore throat that started 2 day(s) ago. Denies fever, chills, body aches, nausea, vomiting, cough.        The history is provided by the patient. No  was used.     Review of patient's allergies indicates:  No Known Allergies  Past Medical History:   Diagnosis Date    Diverticulosis     HTN (hypertension)      Past Surgical History:   Procedure Laterality Date    COLONOSCOPY, WITH POLYPECTOMY USING SNARE N/A 1/18/2023    Procedure: COLONOSCOPY, WITH POLYPECTOMY USING SNARE;  Surgeon: Aliza Pulido MD;  Location: [facility] ENDOSCOPY;  Service: Gastroenterology;  Laterality: N/A;    EGD, WITH CLOSED BIOPSY N/A 1/18/2023    Procedure: EGD, WITH CLOSED BIOPSY;  Surgeon: Aliza Pulido MD;  Location: [facility] ENDOSCOPY;  Service: Gastroenterology;  Laterality: N/A;     History reviewed. No pertinent family history.  Social History     Tobacco Use    Smoking status: Never    Smokeless tobacco: Never   Substance Use Topics    Alcohol use: Not Currently    Drug use: Yes     Frequency: 7.0 times per week     Types: Marijuana     Review of Systems   Constitutional:  Negative for chills, fatigue and fever.   HENT:  Positive for congestion, postnasal drip, rhinorrhea and sore throat. Negative for ear pain and sinus pain.    Eyes:  Negative for pain.   Respiratory:  Negative for cough, chest tightness and shortness of breath.    Cardiovascular:  Negative for chest pain.   Gastrointestinal:  Negative for abdominal pain, constipation, diarrhea, nausea and vomiting.   Genitourinary:  Negative for dysuria.   Musculoskeletal:  Negative for back pain and joint swelling.   Skin:  Negative for color change and rash.   Neurological:  Negative for  dizziness and weakness.   Psychiatric/Behavioral:  Negative for behavioral problems and confusion.        Physical Exam     Initial Vitals [12/07/23 1306]   BP Pulse Resp Temp SpO2   (!) 141/91 67 20 96.3 °F (35.7 °C) 99 %      MAP       --         Physical Exam    Nursing note and vitals reviewed.  Constitutional: He appears well-developed and well-nourished.   HENT:   Head: Normocephalic and atraumatic.   Nose: Nose normal.   Mouth/Throat: No oropharyngeal exudate.   Clear post nasal drainage and erythematous nasal turbinates, mild erythema of posterior pharynx   Eyes: Conjunctivae and EOM are normal. Pupils are equal, round, and reactive to light.   Neck: Neck supple. No JVD present.   Normal range of motion.  Cardiovascular:  Normal rate, regular rhythm, normal heart sounds and intact distal pulses.           Pulmonary/Chest: Breath sounds normal. No respiratory distress. He has no wheezes. He has no rhonchi. He has no rales.   Abdominal: Abdomen is soft. Bowel sounds are normal. There is no abdominal tenderness.   Musculoskeletal:         General: Normal range of motion.      Cervical back: Normal range of motion and neck supple.     Neurological: He is alert and oriented to person, place, and time.   Skin: Skin is warm. Capillary refill takes less than 2 seconds.   Psychiatric: He has a normal mood and affect. Thought content normal.         ED Course   Procedures  Labs Reviewed   COVID/RSV/FLU A&B PCR - Normal    Narrative:     The Xpert Xpress SARS-CoV-2/FLU/RSV plus is a rapid, multiplexed real-time PCR test intended for the simultaneous qualitative detection and differentiation of SARS-CoV-2, Influenza A, Influenza B, and respiratory syncytial virus (RSV) viral RNA in either nasopharyngeal swab or nasal swab specimens.         STREP GROUP A BY PCR - Normal    Narrative:     The Xpert Xpress Strep A test is a rapid, qualitative in vitro diagnostic test for the detection of Streptococcus pyogenes (Group A  ß-hemolytic Streptococcus, Strep A) in throat swab specimens from patients with signs and symptoms of pharyngitis.            Imaging Results    None          Medications - No data to display  Medical Decision Making  DDX: covid, flu, rsv, sinusitis, strep, among others    Dilan Renteria Jr. is a 38 y.o. male who presents to the ED with complaints of nasal congestion, nasal discharge, and sore throat that started 2 day(s) ago. Denies fever, chills, body aches, nausea, vomiting, cough.  Covid, flu, rsv, strep swab negative. Will DC him home with Zyrtec and Flonase. Instructed him to gargle with salt water. Strict return precautions given. Stable for discharge.     Amount and/or Complexity of Data Reviewed  Labs: ordered.                                      Clinical Impression:  Final diagnoses:  [J02.9] Pharyngitis, unspecified etiology (Primary)  [J30.9] Allergic rhinitis, unspecified seasonality, unspecified trigger          ED Disposition Condition    Discharge Stable          ED Prescriptions       Medication Sig Dispense Start Date End Date Auth. Provider    cetirizine (ZYRTEC) 10 MG tablet Take 1 tablet (10 mg total) by mouth once daily. 30 tablet 12/7/2023 12/6/2024 Sandra Campos PA-C    fluticasone propionate (FLONASE) 50 mcg/actuation nasal spray 1 spray (50 mcg total) by Each Nostril route 2 (two) times daily as needed for Rhinitis. 15 g 12/7/2023 -- Sandra Campos PA-C          Follow-up Information       Follow up With Specialties Details Why Contact Info    [facility]  In 1 week  [address] [id number]    [facility] Emergency Medicine In 3 days As needed, If symptoms worsen [address] [address] [phone]             Sandra Campos PA-C  12/07/23 1023

## 2024-07-20 ENCOUNTER — HOSPITAL ENCOUNTER (EMERGENCY)
Facility: HOSPITAL | Age: 39
Discharge: HOME | End: 2024-07-20
Attending: STUDENT IN AN ORGANIZED HEALTH CARE EDUCATION/TRAINING PROGRAM
Payer: COMMERCIAL

## 2024-07-20 VITALS
DIASTOLIC BLOOD PRESSURE: 89 MMHG | TEMPERATURE: 98 F | HEIGHT: 73 IN | RESPIRATION RATE: 20 BRPM | WEIGHT: 300 LBS | HEART RATE: 86 BPM | OXYGEN SATURATION: 98 % | SYSTOLIC BLOOD PRESSURE: 144 MMHG | BODY MASS INDEX: 39.76 KG/M2

## 2024-07-20 DIAGNOSIS — M54.50 BILATERAL LOW BACK PAIN, UNSPECIFIED CHRONICITY, UNSPECIFIED WHETHER SCIATICA PRESENT: ICD-10-CM
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
## 2024-07-20 DIAGNOSIS — M54.2 MUSCULOSKELETAL NECK PAIN: ICD-10-CM

## 2024-07-20 PROCEDURE — 25000003 PHARM REV CODE 250: Performed by: NURSE PRACTITIONER

## 2024-07-20 PROCEDURE — 99284 EMERGENCY DEPT VISIT MOD MDM: CPT | Mod: 25

## 2024-07-20 RX ORDER — CYCLOBENZAPRINE HCL 5 MG
5 TABLET ORAL 3 TIMES DAILY PRN
Qty: 15 TABLET | Refills: 0 | Status: SHIPPED | OUTPATIENT
Start: 2024-07-20

## 2024-07-20 RX ORDER — HYDROCODONE BITARTRATE AND ACETAMINOPHEN 7.5; 325 MG/1; MG/1
1 TABLET ORAL EVERY 6 HOURS PRN
Qty: 12 TABLET | Refills: 0 | Status: SHIPPED | OUTPATIENT
Start: 2024-07-20 | End: 2024-08-01

## 2024-07-20 RX ORDER — HYDROCODONE BITARTRATE AND ACETAMINOPHEN 7.5; 325 MG/1; MG/1
1 TABLET ORAL
Status: COMPLETED | OUTPATIENT
Start: 2024-07-20 | End: 2024-07-20

## 2024-07-20 RX ADMIN — HYDROCODONE BITARTRATE AND ACETAMINOPHEN 1 TABLET: 7.5; 325 TABLET ORAL at 01:07

## 2024-07-20 RX ADMIN — IBUPROFEN 800 MG: 200 TABLET, FILM COATED ORAL at 12:07
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
## 2024-08-13 NOTE — H&P
EGD and Colonoscopy History and Physical    Patient Name: Dilan Renteria Jr.  MRN: [id number]  : 1985  Date of Procedure:  2023  Referring Physician: Eladia Smith FNP  Primary Physician: Primary Doctor No  Procedure Physician: Aliza Pulido MD, MPH     Procedure - EGD and Colonoscopy  ASA - per anesthesia  Mallampati - per anesthesia  History of Anesthesia problems - no  Family history Anesthesia problems -  no   Plan of anesthesia - General    Diagnosis: diverticulitis, loose stools, reflux  Chief Complaint: Same as above    HPI: Patient is an 37 y.o. male is here for the above.     Mr. Renteria is a 37-year-old AAM with obesity, HTN, history of diverticulitis and perirectal abscess here for an EGD and colonoscopy.       He presented to the ED at [facility] 2022 with reports rectal pain for the past few weeks.  He denied constipation and reported passing normal stools.  He had discomfort with passing a bowel movement and slight relief with bowel movement.  He noted some mild left lower quadrant abdominal pain at times.  Physical exam revealed small external hemorrhoid at the 3:00 position. Normal CBC, CMP. CT scan abdomen and pelvis with contrast revealed multiple diverticula seen in the descending and sigmoid colon with focal fat stranding in the pericolonic space along the sigmoid colon at the location of the previously present diverticulitis, this suggest recurrent diverticulitis, wall of body and antrum of stomach seems thick with recommendation to assess for gastritis.  He was discharged home with Augmentin 875 mg twice daily for 10 days and recommended follow-up with GI clinic.     He presented back to the ED at [facility] 2022 with reports of abscess to buttock since the previous day.  He denied fever and reported pain 10/10 to the site despite warm compresses and Epsom salt soaks.  Physical exam revealed perirectal abscess, 1.5 cm x 1.5 cm that was actively draining.  Incision  Subjective   Abelardo Moore is a 50 year old male presenting with   Chief Complaint   Patient presents with    Follow-up     Discuss lab results / declined vaccines       HPI  The patient presents for evaluation of multiple medical concerns.    He reports overall good health. His diabetes is well-managed, with blood sugar levels typically within the normal range when checked. He has been tolerating the increased dose of Trulicity without any issues. He has also resumed his workout routine.    He has a scheduled appointment with Dr. Lobo on 08/20/2024.    He has not yet received the shingles vaccine.    He sees his kidney specialist once a year. He was put back on mycophenolate twice a day, 250 mg each dose.    He sometimes experiences a cough and wants to know if any of his medications could be causing it. He is tolerating lisinopril.    Patient Active Problem List   Diagnosis    Benign essential hypertension    Acquired hypothyroidism    MCGN (minimal change glomerulonephritis)  DX 2019    Nephrotic syndrome    Type 2 diabetes mellitus, without long-term current use of insulin  (CMD)       Past Medical History:   Diagnosis Date    Diabetes mellitus  (CMD)     Essential (primary) hypertension     High cholesterol     Hypothyroidism     Nephrotic syndrome        Past Surgical History:   Procedure Laterality Date    Hernia repair  2012    Upper arm/elbow surgery unlisted Left 2015       Social History     Socioeconomic History    Marital status: Single     Spouse name: Not on file    Number of children: Not on file    Years of education: Not on file    Highest education level: Not on file   Occupational History    Not on file   Tobacco Use    Smoking status: Never    Smokeless tobacco: Never   Vaping Use    Vaping status: never used   Substance and Sexual Activity    Alcohol use: Yes     Alcohol/week: 1.0 standard drink of alcohol     Types: 1 Standard drinks or equivalent per week    Drug use: Never    Sexual  activity: Not on file   Other Topics Concern    Not on file   Social History Narrative    Not on file     Social Determinants of Health     Financial Resource Strain: Not on file   Food Insecurity: Not on file   Transportation Needs: Not on file   Physical Activity: Not on file   Stress: Not on file   Social Connections: Unknown (5/17/2024)    Received from [facility] Short Social Needs Screening - Social Connection     Would you like help with any of the following needs: food, medicine/medical supplies, transportation, loneliness, housing or utilities?: Not on file   Interpersonal Safety: Not on file       Current Outpatient Medications   Medication Sig Dispense Refill    fluocinonide (LIDEX) 0.05 % ointment APPLY TWICE A DAY to the hands. DO NOT APPLY TO FACEFOLDSGENITALS      loratadine-pseudoephedrine (CLARITIN-D 24-hour)  MG per 24 hr tablet       mycophenolate (CELLCEPT) 250 MG capsule Take 250 mg by mouth.      lisinopril (ZESTRIL) 10 MG tablet Take 1 tablet by mouth daily. 90 tablet 1    dulaglutide (Trulicity) 1.5 MG/0.5ML pen-injector Inject 1.5 mg into the skin every 7 days. Indications: Type 2 Diabetes 6 mL 3    levothyroxine 50 MCG tablet Take 1 tablet by mouth daily. 90 tablet 1    rosuvastatin (CRESTOR) 40 MG tablet Take 1 tablet by mouth daily. 90 tablet 1    folic acid (FOLATE) 1 MG tablet Take 1,000 mcg by mouth daily.      vitamin B-12 (CYANOCOBALAMIN) 1000 MCG tablet Take 1,000 mcg by mouth daily.      Ascorbic Acid (vitamin C) 500 MG tablet Take 500 mg by mouth daily.       No current facility-administered medications for this visit.       Review of Systems   Constitutional:  Negative for activity change, diaphoresis, fatigue and unexpected weight change.   Respiratory:  Negative for cough, chest tightness and shortness of breath.    Cardiovascular:  Negative for chest pain, palpitations and leg swelling.   Gastrointestinal:  Negative for abdominal pain, blood in stool, nausea  and drainage was performed without complications.  Laboratory results revealed unremarkable CMP; .70; and unremarkable CBC.  CT scan abdomen and pelvis with contrast revealed extensive skin and subcutaneous thickening with mild enhancement seen posteriorly in the left medial gluteal region extending to the perianal and perineal regions.  There is a skin defect seen overlying the same induration.  Focal gas bubbles are seen within this thickening with some areas of hypodensity suggesting impending abscess formation without any other overt signs of collections.  As compared to previous, there was marked reduction of the induration and collection within which had been overlying the sacral region.  He was evaluated by general surgery and recommended sitz baths with outpatient colonoscopy.      Today, he presents for a follow-up visit. He reports improvement of abdominal pain since completing antibiotics as directed but continues to have episodes 1-2 times a month which consistent of LLQ pain and difficulty urinating.  He has abx prescribed by PCP which he will take for a few days with improvement of symptoms.     He has intermittent symptoms of acid reflux and pyrosis described as burning in his chest and regurgitation of acid into the back of his throat. He takes Nexium 40 mg daily which is helpful but if he eats late at night (which he does every night) will have nocturnal symptoms.  His appetite is good and his weight is stable. He denies fever, chills, nausea, vomiting, hematemesis, odynophagia, dysphagia, early satiety, or abdominal pain. He has 3-4 loose bowel movements daily which has been has been his normal for several years per patient.  He denies melena, hematochezia, fecal urgency, fecal incontinence, or pain with defecation.     He denies ever having an EGD or colonoscopy done. He denies regular NSAID use or use of blood thinners. He denies tobacco or alcohol use. He smokes marijuana daily. He denies  "a family history of IBD, colon polyps, or colon cancer.      Last colonoscopy: never  Family history: denies  Anticoagulation: none    ROS:  Constitutional: No fevers, chills, No weight loss  CV: No chest pain  Pulm: No cough, No shortness of breath  GI: see HPI    Medical History:   Past Medical History:   Diagnosis Date    Diverticulosis     HTN (hypertension)          Surgical History:   History reviewed. No pertinent surgical history.    Family History:   History reviewed. No pertinent family history..    Social History:   Social History     Socioeconomic History    Marital status: Single   Tobacco Use    Smoking status: Never    Smokeless tobacco: Never   Substance and Sexual Activity    Alcohol use: Not Currently    Drug use: Yes     Frequency: 7.0 times per week     Types: Marijuana       Review of patient's allergies indicates:  No Known Allergies    Medications:   Medications Prior to Admission   Medication Sig Dispense Refill Last Dose    ALPRAZolam (XANAX) 0.5 MG tablet Take 0.5 mg by mouth every evening.   Past Week    amLODIPine (NORVASC) 10 MG tablet Take 10 mg by mouth once daily.   1/18/2023 at 1000    atenoloL (TENORMIN) 25 MG tablet Take 25 mg by mouth once daily.   1/17/2023 at 2000    esomeprazole (NEXIUM) 40 MG capsule Take 1 capsule (40 mg total) by mouth before breakfast. 90 capsule 3 1/17/2023         Physical Exam:    Vital Signs:   Vitals:    01/18/23 1109   BP: (!) 141/100   Pulse: 86   Resp: 20   Temp: 97.9 °F (36.6 °C)     BP (!) 141/100 (BP Location: Left arm, Patient Position: Lying)   Pulse 86   Temp 97.9 °F (36.6 °C) (Oral)   Resp 20   Ht 6' 1" (1.854 m)   Wt (!) 138.4 kg (305 lb 1.9 oz)   SpO2 100%   BMI 40.26 kg/m²     General:          Well appearing in no acute distress  Lungs: Clear to auscultation bilaterally, respirations unlabored  Heart: Regular rate and rhythm, S1 and S2 normal, no obvious murmurs  Abdomen:         Soft, non-tender, bowel sounds normal, no masses, no " and vomiting.   Endocrine: Negative for cold intolerance, heat intolerance, polydipsia and polyphagia.   Genitourinary:  Negative for difficulty urinating.   Musculoskeletal:  Negative for arthralgias and myalgias.   Skin:  Negative for pallor and rash.   Neurological:  Negative for dizziness, syncope, light-headedness, numbness and headaches.   Hematological:  Does not bruise/bleed easily.   Psychiatric/Behavioral:  Negative for dysphoric mood. The patient is not nervous/anxious.      Visit Vitals  /66 (BP Location: LUE - Left upper extremity, Patient Position: Sitting, Cuff Size: Regular)   Pulse 75   Temp 97.9 °F (36.6 °C) (Temporal)   Resp 14   Ht 5' 10\" (1.778 m)   Wt 85.2 kg (187 lb 13.3 oz)   SpO2 94%   BMI 26.95 kg/m²     No results found for this visit on 08/12/24.    Objective   Physical Exam  Vitals and nursing note reviewed.   Constitutional:       Appearance: Normal appearance. He is well-developed and overweight.   HENT:      Head: Normocephalic and atraumatic.      Right Ear: External ear normal.      Left Ear: External ear normal.      Nose: Nose normal.      Mouth/Throat:      Mouth: Mucous membranes are moist.      Pharynx: Oropharynx is clear.      Neck: Neck supple.   Eyes:      General: No scleral icterus.     Conjunctiva/sclera: Conjunctivae normal.      Pupils: Pupils are equal, round, and reactive to light.   Neck:      Vascular: No carotid bruit.   Cardiovascular:      Rate and Rhythm: Normal rate and regular rhythm.      Pulses: Normal pulses.           Dorsalis pedis pulses are 2+ on the right side and 2+ on the left side.        Posterior tibial pulses are 2+ on the right side and 2+ on the left side.      Heart sounds: Normal heart sounds.   Pulmonary:      Effort: Pulmonary effort is normal.      Breath sounds: Normal breath sounds.   Abdominal:      General: Bowel sounds are normal.      Palpations: Abdomen is soft.      Tenderness: There is no abdominal tenderness.    Musculoskeletal:         General: Normal range of motion.      Right lower leg: No edema.      Left lower leg: No edema.      Right foot: No deformity.      Left foot: No deformity.   Feet:      Right foot:      Protective Sensation: 10 sites tested.  10 sites sensed.      Skin integrity: No ulcer.      Left foot:      Protective Sensation: 10 sites tested.  10 sites sensed.      Skin integrity: No ulcer.   Skin:     General: Skin is warm and dry.      Capillary Refill: Capillary refill takes less than 2 seconds.      Coloration: Skin is not jaundiced or pale.      Findings: No bruising or rash.   Neurological:      Mental Status: He is alert and oriented to person, place, and time.   Psychiatric:         Mood and Affect: Mood normal.         Behavior: Behavior normal.       Lab Services on 08/06/2024   Component Date Value Ref Range Status    Hemoglobin A1C 08/06/2024 5.6  4.5 - 5.6 % Final      Diabetic Screening  Non Diabetic:             <5.7%  Increased Risk:           5.7-6.4%  Diagnostic For Diabetes:  >6.4%    Diabetic Control  A1C%       eAG mg/dL  6.0            126  6.5            140  7.0            154  7.5            169  8.0            183  8.5            197  9.0            212  9.5            226  10.0           240    Fasting Status 08/06/2024 12  0 - 999 Hours Final    Sodium 08/06/2024 140  135 - 145 mmol/L Final    Potassium 08/06/2024 3.8  3.4 - 5.1 mmol/L Final    Chloride 08/06/2024 108  97 - 110 mmol/L Final    Carbon Dioxide 08/06/2024 27  21 - 32 mmol/L Final    Anion Gap 08/06/2024 9  7 - 19 mmol/L Final    Glucose 08/06/2024 106 (H)  70 - 99 mg/dL Final    BUN 08/06/2024 22 (H)  6 - 20 mg/dL Final    Creatinine 08/06/2024 1.27 (H)  0.67 - 1.17 mg/dL Final    Glomerular Filtration Rate 08/06/2024 69  >=60 Final    eGFR results = or >60 mL/min/1.73m2 = Normal kidney function. Estimated GFR calculated using the CKD-EPI-R (2021) equation that does not include race in the creatinine  organomegaly        Labs:  Lab Results   Component Value Date    WBC 10.3 09/18/2022    HGB 15.7 09/18/2022    HCT 48.5 09/18/2022    MCV 81.6 09/18/2022     09/18/2022     No results found for: INR, PT, APTT  Lab Results   Component Value Date     09/18/2022    K 3.6 09/18/2022    CO2 26 09/18/2022    BUN 12.8 09/18/2022    CREATININE 0.96 09/18/2022    LABPROT 8.1 09/18/2022    ALBUMIN 3.9 09/18/2022    BILITOT 0.4 09/18/2022    ALKPHOS 75 09/18/2022    ALT 38 09/18/2022    AST 26 09/18/2022       Assessment and Plan:     History reviewed, vital signs satisfactory, cardiopulmonary status satisfactory.  I have explained the sedation options, risks, benefits, and alternatives of this endoscopic procedure to the patient including but not limited to bleeding, inflammation, infection, perforation, and death.  All questions were answered and the patient consented to proceed with procedure as planned.   The patient is deemed an appropriate candidate for the sedation as planned.      Aliza Pulido MD, MPH   of Clinical Medicine  Gastroenterology and Hepatology  [facility]    1/18/2023  11:59 AM      calculation.    BUN/Cr 08/06/2024 17  7 - 25 Final    Calcium 08/06/2024 8.7  8.4 - 10.2 mg/dL Final    TSH 08/06/2024 2.173  0.350 - 5.000 mcUnits/mL Final    WBC 08/06/2024 6.6  4.2 - 11.0 K/mcL Final    RBC 08/06/2024 4.70  4.50 - 5.90 mil/mcL Final    HGB 08/06/2024 15.1  13.0 - 17.0 g/dL Final    HCT 08/06/2024 43.7  39.0 - 51.0 % Final    MCV 08/06/2024 93.0  78.0 - 100.0 fl Final    MCH 08/06/2024 32.1  26.0 - 34.0 pg Final    MCHC 08/06/2024 34.6  32.0 - 36.5 g/dL Final    RDW-CV 08/06/2024 11.7  11.0 - 15.0 % Final    RDW-SD 08/06/2024 40.1  39.0 - 50.0 fL Final    PLT 08/06/2024 187  140 - 450 K/mcL Final    NRBC 08/06/2024 0  <=0 /100 WBC Final    Neutrophil, Percent 08/06/2024 68  % Final    Lymphocytes, Percent 08/06/2024 18  % Final    Mono, Percent 08/06/2024 8  % Final    Eosinophils, Percent 08/06/2024 5  % Final    Basophils, Percent 08/06/2024 1  % Final    Immature Granulocytes 08/06/2024 0  % Final    Absolute Neutrophils 08/06/2024 4.5  1.8 - 7.7 K/mcL Final    Absolute Lymphocytes 08/06/2024 1.2  1.0 - 4.8 K/mcL Final    Absolute Monocytes 08/06/2024 0.5  0.3 - 0.9 K/mcL Final    Absolute Eosinophils  08/06/2024 0.4  0.0 - 0.5 K/mcL Final    Absolute Basophils 08/06/2024 0.1  0.0 - 0.3 K/mcL Final    Absolute Immature Granulocytes 08/06/2024 0.0  0.0 - 0.2 K/mcL Final      Laboratory Studies A1c is 5.6%. Serum creatinine is 1.27. Blood sugar is 106. Cholesterol is 113, LDL is 54.     Abelardo was seen today for follow-up.    Diagnoses and all orders for this visit:    Type 2 diabetes mellitus with stage 2 chronic kidney disease, without long-term current use of insulin  (CMD)    Benign essential hypertension    Acquired hypothyroidism    Need for shingles vaccine  -     ZOSTER (SHINGRIX)    Overweight (BMI 25.0-29.9)       Orders Placed This Encounter    ZOSTER (SHINGRIX)      1. Type 2 diabetes mellitus without long-term use of insulin.  His A1c level is stable at 5.6%, meeting the goal of  less than 7%. He has lost 6 pounds since the last visit, now weighing 187 pounds. He is advised to continue with Trulicity and adhere to a low-carb, low-calorie diet as per the ADA guideline. Nutritional education was provided. Recommendations include yearly diabetic eye exams with ophthalmology for retinopathy screening, yearly diabetic foot exams with a podiatrist for diabetic foot care, and regular A1c checks every 4 to 6 months. Emphasis was placed on lifestyle changes, a healthy diet, exercise, and weight reduction.    2. Chronic kidney disease stage 2.  Kidney function is stable with a serum creatinine of 1.27. He is advised to continue seeing his kidney specialist annually and to continue taking mycophenolate 250 mg twice a day to control minimal change glomerulonephritis and prevent protein loss. Infection control education was provided due to the increased risk of infections from mycophenolate.    3. Benign essential hypertension.  His blood pressure is well-controlled, meeting the goal of less than 130/80. He is to continue with lisinopril and a salt-restricted diet. Nutritional education was provided and the management of hypertension was discussed. Home blood pressure monitoring was advised, and he was instructed to call if blood pressure persistently exceeds 150/90 or drops below 100/60.    4. Acquired hypothyroidism.  He is clinically and biochemically euthyroid. He is to continue with levothyroxine and periodically check TSH and free T4.    5. Need for shingles vaccine.  Two doses of Shingrix are recommended, to be administered between 2 to 6 months apart. The contraindications were explained and he agreed to the plan. The first dose of Shingrix was administered today, with the second dose due after 2 to 6 months.    6. Overweight.  His BMI is 28. He is advised to continue his exercise regimen and maintain a healthy diet to achieve further weight reduction.    7. Vaccination history reviewed.  He is  up to date with hepatitis A and B, pneumococcal, Tdap, and COVID-19 vaccinations. A yearly influenza vaccine and COVID-19 booster for 2023 and 2024 are recommended. The indications were explained.    Follow-up  The patient will follow up in 4 months.  Return in about 4 months (around 12/12/2024) for Follow up.       Return in about 4 months (around 12/12/2024) for Follow up.

## 2025-02-04 ENCOUNTER — HOSPITAL ENCOUNTER (EMERGENCY)
Facility: HOSPITAL | Age: 40
Discharge: HOME | End: 2025-02-04
Attending: INTERNAL MEDICINE
Payer: MEDICAID

## 2025-02-04 VITALS
HEIGHT: 73 IN | OXYGEN SATURATION: 96 % | BODY MASS INDEX: 40.02 KG/M2 | DIASTOLIC BLOOD PRESSURE: 62 MMHG | SYSTOLIC BLOOD PRESSURE: 124 MMHG | WEIGHT: 302 LBS | TEMPERATURE: 98 F | RESPIRATION RATE: 16 BRPM | HEART RATE: 65 BPM

## 2025-02-04 DIAGNOSIS — K57.92 DIVERTICULITIS: ICD-10-CM

## 2025-02-04 DIAGNOSIS — K62.5 RECTAL BLEEDING: Primary | ICD-10-CM

## 2025-02-04 LAB
ALBUMIN SERPL-MCNC: 4 G/DL (ref 3.5–5)
ALBUMIN/GLOB SERPL: 1.1 RATIO (ref 1.1–2)
ALP SERPL-CCNC: 61 UNIT/L (ref 40–150)
ALT SERPL-CCNC: 17 UNIT/L (ref 0–55)
ANION GAP SERPL CALC-SCNC: 8 MEQ/L
AST SERPL-CCNC: 16 UNIT/L (ref 5–34)
BACTERIA #/AREA URNS AUTO: ABNORMAL /HPF
BASOPHILS # BLD AUTO: 0.05 X10(3)/MCL
BASOPHILS NFR BLD AUTO: 0.5 %
BILIRUB SERPL-MCNC: 0.4 MG/DL
BILIRUB UR QL STRIP.AUTO: NEGATIVE
BUN SERPL-MCNC: 12.4 MG/DL (ref 8.9–20.6)
CALCIUM SERPL-MCNC: 9.1 MG/DL (ref 8.4–10.2)
CHLORIDE SERPL-SCNC: 107 MMOL/L (ref 98–107)
CLARITY UR: CLEAR
CO2 SERPL-SCNC: 24 MMOL/L (ref 22–29)
COLOR UR AUTO: ABNORMAL
CREAT SERPL-MCNC: 0.71 MG/DL (ref 0.72–1.25)
CREAT/UREA NIT SERPL: 17
EOSINOPHIL # BLD AUTO: 0.21 X10(3)/MCL (ref 0–0.9)
EOSINOPHIL NFR BLD AUTO: 2.3 %
ERYTHROCYTE [DISTWIDTH] IN BLOOD BY AUTOMATED COUNT: 13.7 % (ref 11.5–17)
GFR SERPLBLD CREATININE-BSD FMLA CKD-EPI: >60 ML/MIN/1.73/M2
GLOBULIN SER-MCNC: 3.7 GM/DL (ref 2.4–3.5)
GLUCOSE SERPL-MCNC: 90 MG/DL (ref 74–100)
GLUCOSE UR QL STRIP: NORMAL
HCT VFR BLD AUTO: 47.1 % (ref 42–52)
HGB BLD-MCNC: 15.2 G/DL (ref 14–18)
HGB UR QL STRIP: NEGATIVE
HOLD SPECIMEN: NORMAL
HYALINE CASTS #/AREA URNS LPF: ABNORMAL /LPF
IMM GRANULOCYTES # BLD AUTO: 0.01 X10(3)/MCL (ref 0–0.04)
IMM GRANULOCYTES NFR BLD AUTO: 0.1 %
KETONES UR QL STRIP: NEGATIVE
LEUKOCYTE ESTERASE UR QL STRIP: NEGATIVE
LIPASE SERPL-CCNC: 26 U/L
LYMPHOCYTES # BLD AUTO: 3.8 X10(3)/MCL (ref 0.6–4.6)
LYMPHOCYTES NFR BLD AUTO: 40.9 %
MCH RBC QN AUTO: 26.4 PG (ref 27–31)
MCHC RBC AUTO-ENTMCNC: 32.3 G/DL (ref 33–36)
MCV RBC AUTO: 81.8 FL (ref 80–94)
MONOCYTES # BLD AUTO: 0.6 X10(3)/MCL (ref 0.1–1.3)
MONOCYTES NFR BLD AUTO: 6.5 %
MUCOUS THREADS URNS QL MICRO: ABNORMAL /LPF
NEUTROPHILS # BLD AUTO: 4.63 X10(3)/MCL (ref 2.1–9.2)
NEUTROPHILS NFR BLD AUTO: 49.7 %
NITRITE UR QL STRIP: NEGATIVE
NRBC BLD AUTO-RTO: 0 %
PH UR STRIP: 6.5 [PH]
PLATELET # BLD AUTO: 241 X10(3)/MCL (ref 130–400)
PMV BLD AUTO: 10.8 FL (ref 7.4–10.4)
POTASSIUM SERPL-SCNC: 3.8 MMOL/L (ref 3.5–5.1)
PROT SERPL-MCNC: 7.7 GM/DL (ref 6.4–8.3)
PROT UR QL STRIP: NEGATIVE
RBC # BLD AUTO: 5.76 X10(6)/MCL (ref 4.7–6.1)
RBC #/AREA URNS AUTO: ABNORMAL /HPF
SODIUM SERPL-SCNC: 139 MMOL/L (ref 136–145)
SP GR UR STRIP.AUTO: 1.03 (ref 1–1.03)
SQUAMOUS #/AREA URNS LPF: ABNORMAL /HPF
UROBILINOGEN UR STRIP-ACNC: NORMAL
WBC # BLD AUTO: 9.3 X10(3)/MCL (ref 4.5–11.5)
WBC #/AREA URNS AUTO: ABNORMAL /HPF

## 2025-02-04 PROCEDURE — 85025 COMPLETE CBC W/AUTO DIFF WBC: CPT | Performed by: NURSE PRACTITIONER

## 2025-02-04 PROCEDURE — 99285 EMERGENCY DEPT VISIT HI MDM: CPT | Mod: 25

## 2025-02-04 PROCEDURE — 80053 COMPREHEN METABOLIC PANEL: CPT | Performed by: NURSE PRACTITIONER

## 2025-02-04 PROCEDURE — 83690 ASSAY OF LIPASE: CPT | Performed by: NURSE PRACTITIONER

## 2025-02-04 PROCEDURE — 25500020 PHARM REV CODE 255

## 2025-02-04 PROCEDURE — 81015 MICROSCOPIC EXAM OF URINE: CPT | Performed by: NURSE PRACTITIONER

## 2025-02-04 RX ORDER — AMOXICILLIN AND CLAVULANATE POTASSIUM 875; 125 MG/1; MG/1
1 TABLET, FILM COATED ORAL 2 TIMES DAILY
Qty: 10 TABLET | Refills: 0 | Status: ON HOLD | OUTPATIENT
Start: 2025-02-04 | End: 2025-02-07 | Stop reason: HOSPADM

## 2025-02-04 RX ORDER — ONDANSETRON 4 MG/1
4 TABLET, ORALLY DISINTEGRATING ORAL EVERY 8 HOURS PRN
Qty: 9 TABLET | Refills: 0 | Status: SHIPPED | OUTPATIENT
Start: 2025-02-04 | End: 2025-02-07

## 2025-02-04 RX ORDER — ATENOLOL 25 MG/1
25 TABLET ORAL DAILY
Qty: 30 TABLET | Refills: 0 | Status: SHIPPED | OUTPATIENT
Start: 2025-02-04 | End: 2025-03-06

## 2025-02-04 RX ADMIN — IOHEXOL: 350 INJECTION, SOLUTION INTRAVENOUS at 11:02

## 2025-02-04 NOTE — ED PROVIDER NOTES
Encounter Date: 2/4/2025       History     Chief Complaint   Patient presents with    Rectal Pain     PT W CO SEVERE RECTAL PAIN W SM AMOUNT OF BRITE RED BLEEDING WHEN HE WIPES > 2 WKS.  WORE X 2 DAYS.  UNSURE IF ABSCESS OR HEMORRHOID.       Patient is a 39-year-old male who presents for evaluation after experiencing rectal pain as well as episodes of bright red blood from his rectum.  Admits to mild symptoms for approximately 2 weeks with more frequent episodes x2 days.  Patient with history of diverticulosis, hypertension, hemorrhoids.  Patient denies abdominal pain, nausea/vomiting, shortness of breath, fever, black stool, or maroon stool.  Admits to having an abscess area in the past and is anxious that one may have formed again.      Review of patient's allergies indicates:  No Known Allergies  Past Medical History:   Diagnosis Date    Diverticulosis     HTN (hypertension)      Past Surgical History:   Procedure Laterality Date    COLONOSCOPY, WITH POLYPECTOMY USING SNARE N/A 1/18/2023    Procedure: COLONOSCOPY, WITH POLYPECTOMY USING SNARE;  Surgeon: Aliza Pulido MD;  Location: [facility] ENDOSCOPY;  Service: Gastroenterology;  Laterality: N/A;    EGD, WITH CLOSED BIOPSY N/A 1/18/2023    Procedure: EGD, WITH CLOSED BIOPSY;  Surgeon: Aliza Pulido MD;  Location: [facility] ENDOSCOPY;  Service: Gastroenterology;  Laterality: N/A;     No family history on file.  Social History     Tobacco Use    Smoking status: Former     Types: Cigarettes    Smokeless tobacco: Never   Substance Use Topics    Alcohol use: Not Currently    Drug use: Yes     Frequency: 7.0 times per week     Types: Marijuana     Review of Systems   Constitutional:  Negative for chills, diaphoresis, fatigue and fever.   HENT:  Negative for facial swelling, postnasal drip, rhinorrhea, sinus pressure, sinus pain, sore throat and trouble swallowing.    Respiratory:  Negative for cough, chest tightness, shortness of breath and wheezing.     Cardiovascular:  Negative for chest pain, palpitations and leg swelling.   Gastrointestinal:  Positive for anal bleeding and rectal pain. Negative for abdominal pain, diarrhea, nausea and vomiting.   Genitourinary:  Negative for dysuria, flank pain, hematuria and urgency.   Musculoskeletal:  Negative for arthralgias, back pain and myalgias.   Skin:  Negative for color change and rash.   Neurological:  Negative for dizziness, syncope, weakness and headaches.   Hematological:  Does not bruise/bleed easily.   All other systems reviewed and are negative.      Physical Exam     Initial Vitals [02/04/25 0930]   BP Pulse Resp Temp SpO2   135/87 79 16 97.9 °F (36.6 °C) 97 %      MAP       --         Physical Exam    Nursing note and vitals reviewed.  Constitutional: Vital signs are normal. He appears well-developed and well-nourished.   HENT:   Head: Normocephalic.   Nose: Nose normal. Mouth/Throat: Oropharynx is clear and moist.   Eyes: Conjunctivae and EOM are normal. Pupils are equal, round, and reactive to light.   Neck: Neck supple.   Normal range of motion.  Cardiovascular:  Normal rate, regular rhythm, normal heart sounds and intact distal pulses.           Pulmonary/Chest: Effort normal and breath sounds normal. No respiratory distress. He has no wheezes. He has no rhonchi. He has no rales. He exhibits no tenderness.   Abdominal: Abdomen is soft and flat. Bowel sounds are normal. There is no abdominal tenderness. There is no rebound, no guarding, no tenderness at McBurney's point and negative Olmedo's sign.   Genitourinary: Rectum:      Guaiac result positive.      Internal hemorrhoid present.      No rectal mass, anal fissure, tenderness or abnormal anal tone.   Guaiac positive stool.   Musculoskeletal:         General: Normal range of motion.      Cervical back: Normal range of motion and neck supple.     Neurological: He is alert and oriented to person, place, and time. He has normal strength.   Skin: Skin is  warm and dry. Capillary refill takes less than 2 seconds.   Psychiatric: He has a normal mood and affect. His behavior is normal. Judgment and thought content normal.         ED Course   Procedures  Labs Reviewed   COMPREHENSIVE METABOLIC PANEL - Abnormal       Result Value    Sodium 139      Potassium 3.8      Chloride 107      CO2 24      Glucose 90      Blood Urea Nitrogen 12.4      Creatinine 0.71 (*)     Calcium 9.1      Protein Total 7.7      Albumin 4.0      Globulin 3.7 (*)     Albumin/Globulin Ratio 1.1      Bilirubin Total 0.4      ALP 61      ALT 17      AST 16      eGFR >60      Anion Gap 8.0      BUN/Creatinine Ratio 17     URINALYSIS, REFLEX TO URINE CULTURE - Abnormal    Color, UA Light-Yellow      Appearance, UA Clear      Specific Gravity, UA 1.026      pH, UA 6.5      Protein, UA Negative      Glucose, UA Normal      Ketones, UA Negative      Blood, UA Negative      Bilirubin, UA Negative      Urobilinogen, UA Normal      Nitrites, UA Negative      Leukocyte Esterase, UA Negative      RBC, UA 0-5      WBC, UA 0-5      Bacteria, UA None Seen      Squamous Epithelial Cells, UA None Seen      Mucous, UA Trace (*)     Hyaline Casts, UA None Seen     CBC WITH DIFFERENTIAL - Abnormal    WBC 9.30      RBC 5.76      Hgb 15.2      Hct 47.1      MCV 81.8      MCH 26.4 (*)     MCHC 32.3 (*)     RDW 13.7      Platelet 241      MPV 10.8 (*)     Neut % 49.7      Lymph % 40.9      Mono % 6.5      Eos % 2.3      Basophil % 0.5      Imm Grans % 0.1      Neut # 4.63      Lymph # 3.80      Mono # 0.60      Eos # 0.21      Baso # 0.05      Imm Gran # 0.01      NRBC% 0.0     LIPASE - Normal    Lipase Level 26     CBC W/ AUTO DIFFERENTIAL    Narrative:     The following orders were created for panel order CBC auto differential.  Procedure                               Abnormality         Status                     ---------                               -----------         ------                     CBC with  Differential[[id number]]       Abnormal            Final result                 Please view results for these tests on the individual orders.   EXTRA TUBES    Narrative:     The following orders were created for panel order EXTRA TUBES.  Procedure                               Abnormality         Status                     ---------                               -----------         ------                     Light Blue Top Hold[[id number]]                             Final result               Red Top Hold[[id number]]                                    Final result               Light Green Top Hold[[id number]]                            Final result               Lavender Top Hold[[id number]]                               Final result               Gold Top Hold[[id number]]                                   Final result                 Please view results for these tests on the individual orders.   LIGHT BLUE TOP HOLD    Extra Tube Hold for add-ons.     RED TOP HOLD    Extra Tube Hold for add-ons.     LIGHT GREEN TOP HOLD    Extra Tube Hold for add-ons.     LAVENDER TOP HOLD    Extra Tube Hold for add-ons.     GOLD TOP HOLD    Extra Tube Hold for add-ons.            Imaging Results              CT Abdomen Pelvis W Wo Contrast (Final result)  Result time 02/04/25 12:34:28   Procedure changed from CT Abdomen Pelvis With IV Contrast NO Oral Contrast     Final result by Isaac Eduardo MD (02/04/25 12:34:28)                   Impression:      Suspect mild sigmoid diverticulitis.  No gross perforation or drainable abscess.      Electronically signed by: Isaac Eduardo  Date:    02/04/2025  Time:    12:34               Narrative:    EXAMINATION:  CT ABDOMEN PELVIS W WO CONTRAST    CLINICAL HISTORY:  GI bleed;Abdominal abscess/infection suspected;    TECHNIQUE:  Helical acquisition through the abdomen and pelvis without and with IV contrast.  Three plane reconstructions were provided for review. DLP 2621 mGycm. Automatic  exposure control, adjustment of mA/kV or iterative reconstruction technique was used to reduce radiation.    COMPARISON:  18 September 2022    FINDINGS:  Mild atelectasis or scarring in the right lung.    There are no acute findings solid abdominal organs.    No bowel obstruction.  Normal appendix.  There is colonic diverticulosis with mild inflammatory changes along the sigmoid.  No gross perforation or drainable abscess.  Perianal soft tissue thickening is similar to prior.  A site of active GI bleeding is not seen.    Urinary bladder is empty.  No pelvic free fluid.  Abdominal aorta normal in caliber.  Mild atherosclerotic disease.    No acute osseous findings.                                       Medications   iohexoL (OMNIPAQUE 350) 350 mg iodine/mL injection ( Intravenous Given 2/4/25 1145)     Medical Decision Making  Differential:   Hemorrhoid   Rectal bleeding  Diverticulosis   Diverticulitis    Amount and/or Complexity of Data Reviewed  Labs: ordered.  Radiology: ordered.               ED Course as of 02/04/25 1249   Tue Feb 04, 2025   1247 Given strict ED return precautions. I have spoken with the patient and/or caregivers. I have explained the patient's condition, diagnoses and treatment plan based on the information available to me at this time. I have answered the patient's and/or caregiver's questions and addressed any concerns. The patient and/or caregivers have as good an understanding of the patient's diagnosis, condition and treatment plan as can be expected at this point. The vital signs have been stable. The patient's condition is stable and appropriate for discharge from the emergency department.      The patient will pursue further outpatient evaluation with the primary care physician or other designated or consulting physician as outlined in the discharge instructions. The patient and/or caregivers are agreeable to this plan of care and follow-up instructions have been explained in detail.  The patient and/or caregivers have received these instructions in written format and have expressed an understanding of the discharge instructions. The patient and/or caregivers are aware that any significant change in condition or worsening of symptoms should prompt an immediate return to this or the closest emergency department or a call to 911.   [JA]      ED Course User Index  [JA] Isaac Sawyer Jr., FNP                           Clinical Impression:  Final diagnoses:  [K62.5] Rectal bleeding (Primary)  [K57.92] Diverticulitis          ED Disposition Condition    Discharge Stable          ED Prescriptions       Medication Sig Dispense Start Date End Date Auth. Provider    amoxicillin-clavulanate 875-125mg (AUGMENTIN) 875-125 mg per tablet Take 1 tablet by mouth 2 (two) times daily. for 5 days 10 tablet 2/4/2025 2/9/2025 Isaac Sawyer Jr., FNP    ondansetron (ZOFRAN-ODT) 4 MG TbDL Take 1 tablet (4 mg total) by mouth every 8 (eight) hours as needed (Nausea). 9 tablet 2/4/2025 2/7/2025 Isaac Sawyer Jr., FNP          Follow-up Information       Follow up With Specialties Details Why Contact Info    [facility] - Emergency Dept Emergency Medicine In 3 days As needed, If symptoms worsen [facility] [facility] [address] [phone]    [facility]  Schedule an appointment as soon as possible for a visit in 1 week For follow up with GI Clinic in next 7-14 days to re-establish care [address] [facility] [address]             Isaac Sawyer Jr., FNP  02/04/25 7089

## 2025-02-06 ENCOUNTER — HOSPITAL ENCOUNTER (OUTPATIENT)
Facility: HOSPITAL | Age: 40
Discharge: HOME | End: 2025-02-07
Attending: STUDENT IN AN ORGANIZED HEALTH CARE EDUCATION/TRAINING PROGRAM | Admitting: SURGERY
Payer: MEDICAID

## 2025-02-06 DIAGNOSIS — K61.1 PERIRECTAL ABSCESS: ICD-10-CM

## 2025-02-06 DIAGNOSIS — K61.0 PERIANAL ABSCESS: Primary | ICD-10-CM

## 2025-02-06 LAB
ALBUMIN SERPL-MCNC: 3.9 G/DL (ref 3.5–5)
ALBUMIN/GLOB SERPL: 1 RATIO (ref 1.1–2)
ALP SERPL-CCNC: 67 UNIT/L (ref 40–150)
ALT SERPL-CCNC: 18 UNIT/L (ref 0–55)
ANION GAP SERPL CALC-SCNC: 7 MEQ/L
AST SERPL-CCNC: 17 UNIT/L (ref 5–34)
BACTERIA #/AREA URNS AUTO: ABNORMAL /HPF
BASOPHILS # BLD AUTO: 0.05 X10(3)/MCL
BASOPHILS NFR BLD AUTO: 0.5 %
BILIRUB SERPL-MCNC: 0.5 MG/DL
BILIRUB UR QL STRIP.AUTO: NEGATIVE
BUN SERPL-MCNC: 10.4 MG/DL (ref 8.9–20.6)
CALCIUM SERPL-MCNC: 9.1 MG/DL (ref 8.4–10.2)
CHLORIDE SERPL-SCNC: 109 MMOL/L (ref 98–107)
CLARITY UR: CLEAR
CO2 SERPL-SCNC: 21 MMOL/L (ref 22–29)
COLOR UR AUTO: YELLOW
CREAT SERPL-MCNC: 0.75 MG/DL (ref 0.72–1.25)
CREAT/UREA NIT SERPL: 14
EOSINOPHIL # BLD AUTO: 0.1 X10(3)/MCL (ref 0–0.9)
EOSINOPHIL NFR BLD AUTO: 1 %
ERYTHROCYTE [DISTWIDTH] IN BLOOD BY AUTOMATED COUNT: 13.5 % (ref 11.5–17)
GFR SERPLBLD CREATININE-BSD FMLA CKD-EPI: >60 ML/MIN/1.73/M2
GLOBULIN SER-MCNC: 4.1 GM/DL (ref 2.4–3.5)
GLUCOSE SERPL-MCNC: 116 MG/DL (ref 74–100)
GLUCOSE UR QL STRIP: NORMAL
HCT VFR BLD AUTO: 47.2 % (ref 42–52)
HGB BLD-MCNC: 15.3 G/DL (ref 14–18)
HGB UR QL STRIP: NEGATIVE
HOLD SPECIMEN: NORMAL
HYALINE CASTS #/AREA URNS LPF: ABNORMAL /LPF
IMM GRANULOCYTES # BLD AUTO: 0.02 X10(3)/MCL (ref 0–0.04)
IMM GRANULOCYTES NFR BLD AUTO: 0.2 %
KETONES UR QL STRIP: ABNORMAL
LACTATE SERPL-SCNC: 1 MMOL/L (ref 0.5–2.2)
LEUKOCYTE ESTERASE UR QL STRIP: NEGATIVE
LYMPHOCYTES # BLD AUTO: 2.92 X10(3)/MCL (ref 0.6–4.6)
LYMPHOCYTES NFR BLD AUTO: 29.1 %
MCH RBC QN AUTO: 26.3 PG (ref 27–31)
MCHC RBC AUTO-ENTMCNC: 32.4 G/DL (ref 33–36)
MCV RBC AUTO: 81.1 FL (ref 80–94)
MONOCYTES # BLD AUTO: 0.64 X10(3)/MCL (ref 0.1–1.3)
MONOCYTES NFR BLD AUTO: 6.4 %
MUCOUS THREADS URNS QL MICRO: ABNORMAL /LPF
NEUTROPHILS # BLD AUTO: 6.31 X10(3)/MCL (ref 2.1–9.2)
NEUTROPHILS NFR BLD AUTO: 62.8 %
NITRITE UR QL STRIP: NEGATIVE
NRBC BLD AUTO-RTO: 0 %
PH UR STRIP: 6.5 [PH]
PLATELET # BLD AUTO: 239 X10(3)/MCL (ref 130–400)
PLATELETS.RETICULATED NFR BLD AUTO: 5.3 % (ref 0.9–11.2)
PMV BLD AUTO: 10.2 FL (ref 7.4–10.4)
POTASSIUM SERPL-SCNC: 3.8 MMOL/L (ref 3.5–5.1)
PROT SERPL-MCNC: 8 GM/DL (ref 6.4–8.3)
PROT UR QL STRIP: ABNORMAL
RBC # BLD AUTO: 5.82 X10(6)/MCL (ref 4.7–6.1)
RBC #/AREA URNS AUTO: ABNORMAL /HPF
SODIUM SERPL-SCNC: 137 MMOL/L (ref 136–145)
SP GR UR STRIP.AUTO: 1.04 (ref 1–1.03)
SQUAMOUS #/AREA URNS LPF: ABNORMAL /HPF
UROBILINOGEN UR STRIP-ACNC: ABNORMAL
WBC # BLD AUTO: 10.04 X10(3)/MCL (ref 4.5–11.5)
WBC #/AREA URNS AUTO: ABNORMAL /HPF

## 2025-02-06 PROCEDURE — 11000001 HC ACUTE MED/SURG PRIVATE ROOM

## 2025-02-06 PROCEDURE — 63600175 PHARM REV CODE 636 W HCPCS

## 2025-02-06 PROCEDURE — 85025 COMPLETE CBC W/AUTO DIFF WBC: CPT | Performed by: STUDENT IN AN ORGANIZED HEALTH CARE EDUCATION/TRAINING PROGRAM

## 2025-02-06 PROCEDURE — 96375 TX/PRO/DX INJ NEW DRUG ADDON: CPT

## 2025-02-06 PROCEDURE — 87040 BLOOD CULTURE FOR BACTERIA: CPT | Performed by: STUDENT IN AN ORGANIZED HEALTH CARE EDUCATION/TRAINING PROGRAM

## 2025-02-06 PROCEDURE — 80053 COMPREHEN METABOLIC PANEL: CPT | Performed by: STUDENT IN AN ORGANIZED HEALTH CARE EDUCATION/TRAINING PROGRAM

## 2025-02-06 PROCEDURE — 81015 MICROSCOPIC EXAM OF URINE: CPT | Performed by: STUDENT IN AN ORGANIZED HEALTH CARE EDUCATION/TRAINING PROGRAM

## 2025-02-06 PROCEDURE — G0378 HOSPITAL OBSERVATION PER HR: HCPCS

## 2025-02-06 PROCEDURE — 96372 THER/PROPH/DIAG INJ SC/IM: CPT

## 2025-02-06 PROCEDURE — 25500020 PHARM REV CODE 255

## 2025-02-06 PROCEDURE — 25000003 PHARM REV CODE 250

## 2025-02-06 PROCEDURE — 99285 EMERGENCY DEPT VISIT HI MDM: CPT | Mod: 25

## 2025-02-06 PROCEDURE — 36415 COLL VENOUS BLD VENIPUNCTURE: CPT | Performed by: STUDENT IN AN ORGANIZED HEALTH CARE EDUCATION/TRAINING PROGRAM

## 2025-02-06 PROCEDURE — 83605 ASSAY OF LACTIC ACID: CPT | Performed by: STUDENT IN AN ORGANIZED HEALTH CARE EDUCATION/TRAINING PROGRAM

## 2025-02-06 PROCEDURE — 96366 THER/PROPH/DIAG IV INF ADDON: CPT

## 2025-02-06 PROCEDURE — 96365 THER/PROPH/DIAG IV INF INIT: CPT

## 2025-02-06 RX ORDER — KETOROLAC TROMETHAMINE 30 MG/ML
15 INJECTION, SOLUTION INTRAMUSCULAR; INTRAVENOUS EVERY 6 HOURS PRN
Status: DISCONTINUED | OUTPATIENT
Start: 2025-02-06 | End: 2025-02-07 | Stop reason: HOSPADM

## 2025-02-06 RX ORDER — OXYCODONE HYDROCHLORIDE 5 MG/1
5 TABLET ORAL EVERY 4 HOURS PRN
Status: DISCONTINUED | OUTPATIENT
Start: 2025-02-06 | End: 2025-02-07 | Stop reason: HOSPADM

## 2025-02-06 RX ORDER — ENOXAPARIN SODIUM 100 MG/ML
40 INJECTION SUBCUTANEOUS EVERY 24 HOURS
Status: DISCONTINUED | OUTPATIENT
Start: 2025-02-06 | End: 2025-02-07 | Stop reason: HOSPADM

## 2025-02-06 RX ORDER — SODIUM CHLORIDE 0.9 % (FLUSH) 0.9 %
10 SYRINGE (ML) INJECTION
Status: DISCONTINUED | OUTPATIENT
Start: 2025-02-06 | End: 2025-02-07 | Stop reason: HOSPADM

## 2025-02-06 RX ORDER — ACETAMINOPHEN 325 MG/1
650 TABLET ORAL EVERY 8 HOURS PRN
Status: DISCONTINUED | OUTPATIENT
Start: 2025-02-06 | End: 2025-02-07 | Stop reason: HOSPADM

## 2025-02-06 RX ORDER — OXYCODONE HYDROCHLORIDE 5 MG/1
10 TABLET ORAL EVERY 4 HOURS PRN
Status: DISCONTINUED | OUTPATIENT
Start: 2025-02-06 | End: 2025-02-07 | Stop reason: HOSPADM

## 2025-02-06 RX ORDER — TALC
6 POWDER (GRAM) TOPICAL NIGHTLY PRN
Status: DISCONTINUED | OUTPATIENT
Start: 2025-02-06 | End: 2025-02-07 | Stop reason: HOSPADM

## 2025-02-06 RX ADMIN — PIPERACILLIN AND TAZOBACTAM 4.5 G: 4; .5 INJECTION, POWDER, LYOPHILIZED, FOR SOLUTION INTRAVENOUS; PARENTERAL at 06:02

## 2025-02-06 RX ADMIN — KETOROLAC TROMETHAMINE 15 MG: 30 INJECTION, SOLUTION INTRAMUSCULAR at 10:02

## 2025-02-06 RX ADMIN — IOHEXOL 100 ML: 350 INJECTION, SOLUTION INTRAVENOUS at 04:02

## 2025-02-06 RX ADMIN — ENOXAPARIN SODIUM 40 MG: 40 INJECTION SUBCUTANEOUS at 06:02

## 2025-02-06 RX ADMIN — OXYCODONE HYDROCHLORIDE 10 MG: 5 TABLET ORAL at 06:02

## 2025-02-06 RX ADMIN — ACETAMINOPHEN 650 MG: 325 TABLET, FILM COATED ORAL at 09:02

## 2025-02-06 NOTE — PROGRESS NOTES
"Pharmacokinetic Initial Assessment: IV Vancomycin    Assessment/Plan:    Initiate intravenous vancomycin with loading dose of 2000 mg once followed by a maintenance dose of vancomycin 2000mg IV every 8 hours  Desired empiric serum trough concentration is 10 to 20 mcg/mL  Draw vancomycin trough level 60 min prior to fourth dose on 2/7/2025 at approximately 1800  Pharmacy will continue to follow and monitor vancomycin.      Please contact pharmacy at extension [phone] with any questions regarding this assessment.     Thank you for the consult,   Carrie Menjivar       Patient brief summary:  Dilan Renteria Jr. is a 39 y.o. male initiated on antimicrobial therapy with IV Vancomycin for treatment of suspected skin & soft tissue infection    Drug Allergies:   Review of patient's allergies indicates:  No Known Allergies    Actual Body Weight:   134.3 kg    Renal Function:   Estimated Creatinine Clearance: 190.2 mL/min (based on SCr of 0.75 mg/dL).,     Dialysis Method (if applicable):  N/A    CBC (last 72 hours):  Recent Labs   Lab Result Units 02/04/25  1015 02/06/25  1434   WBC x10(3)/mcL 9.30 10.04   Hgb g/dL 15.2 15.3   Hct % 47.1 47.2   Platelet x10(3)/mcL 241 239   Mono % % 6.5 6.4   Eos % % 2.3 1.0   Basophil % % 0.5 0.5       Metabolic Panel (last 72 hours):  Recent Labs   Lab Result Units 02/04/25  1015 02/04/25  1033 02/06/25  1429 02/06/25  1434   Sodium mmol/L 139  --   --  137   Potassium mmol/L 3.8  --   --  3.8   Chloride mmol/L 107  --   --  109*   CO2 mmol/L 24  --   --  21*   Glucose mg/dL 90  --   --  116*   Glucose, UA   --  Normal Normal  --    Blood Urea Nitrogen mg/dL 12.4  --   --  10.4   Creatinine mg/dL 0.71*  --   --  0.75   Albumin g/dL 4.0  --   --  3.9   Bilirubin Total mg/dL 0.4  --   --  0.5   ALP unit/L 61  --   --  67   AST unit/L 16  --   --  17   ALT unit/L 17  --   --  18       Drug levels (last 3 results):  No results for input(s): "VANCOMYCINRA", "VANCORANDOM", "VANCOMYCINPE", "VANCOPEAK", " ""VANCOMYCINTR", "VANCOTROUGH" in the last 72 hours.    Microbiologic Results:  Microbiology Results (last 7 days)       Procedure Component Value Units Date/Time    Blood culture #2 **CANNOT BE ORDERED STAT** [[id number]]     Order Status: Sent Specimen: Blood     Blood culture #1 **CANNOT BE ORDERED STAT** [[id number]]     Order Status: Sent Specimen: Blood             "

## 2025-02-06 NOTE — ED PROVIDER NOTES
"Encounter Date: 2/6/2025       History     Chief Complaint   Patient presents with    Rectal Pain     Reports rectal pain, possible abscess, getting progressively worse x4-5 days. Was here 2 days ago for same. States prescribed antibiotics not helping. "It got worse".     HPI    39-year-old male with a past medical history of hypertension diverticulosis who was recently diagnosed with diverticulitis 2 days ago presents emergency department for worsening rectal pain.  He is concerned he is getting a rectal abscess.  He also states he has some burning on urination.    Review of patient's allergies indicates:  No Known Allergies  Past Medical History:   Diagnosis Date    Diverticulosis     HTN (hypertension)      Past Surgical History:   Procedure Laterality Date    COLONOSCOPY, WITH POLYPECTOMY USING SNARE N/A 1/18/2023    Procedure: COLONOSCOPY, WITH POLYPECTOMY USING SNARE;  Surgeon: Aliza Pulido MD;  Location: [facility] ENDOSCOPY;  Service: Gastroenterology;  Laterality: N/A;    EGD, WITH CLOSED BIOPSY N/A 1/18/2023    Procedure: EGD, WITH CLOSED BIOPSY;  Surgeon: Aliza Pulido MD;  Location: [facility] ENDOSCOPY;  Service: Gastroenterology;  Laterality: N/A;     No family history on file.  Social History     Tobacco Use    Smoking status: Former     Types: Cigarettes    Smokeless tobacco: Never   Substance Use Topics    Alcohol use: Not Currently    Drug use: Yes     Frequency: 7.0 times per week     Types: Marijuana     Review of Systems   Constitutional:  Negative for fever.   Respiratory:  Negative for cough.    Cardiovascular:  Negative for chest pain.   Gastrointestinal:  Positive for rectal pain. Negative for abdominal pain, constipation, diarrhea, nausea and vomiting.   Neurological:  Negative for headaches.   All other systems reviewed and are negative.      Physical Exam     Initial Vitals [02/06/25 1333]   BP Pulse Resp Temp SpO2   137/86 78 18 98.4 °F (36.9 °C) 99 %      MAP       --     "     Physical Exam    Nursing note and vitals reviewed.  Constitutional: He appears well-developed and well-nourished. No distress.   HENT:   Right Ear: External ear normal.   Left Ear: External ear normal.   Cardiovascular:  Normal rate, regular rhythm and intact distal pulses.           Pulmonary/Chest: Breath sounds normal. No respiratory distress. He has no wheezes. He has no rhonchi.   Abdominal: Abdomen is soft. Bowel sounds are normal. There is no abdominal tenderness.   Genitourinary:    Genitourinary Comments: Masslike tenderness to the rectal verge on the left     Musculoskeletal:         General: Normal range of motion.     Neurological: He is alert and oriented to person, place, and time. GCS score is 15. GCS eye subscore is 4. GCS verbal subscore is 5. GCS motor subscore is 6.   Skin: Skin is warm. Capillary refill takes less than 2 seconds.   Psychiatric: He has a normal mood and affect. Thought content normal.         ED Course   Procedures  Labs Reviewed   COMPREHENSIVE METABOLIC PANEL - Abnormal       Result Value    Sodium 137      Potassium 3.8      Chloride 109 (*)     CO2 21 (*)     Glucose 116 (*)     Blood Urea Nitrogen 10.4      Creatinine 0.75      Calcium 9.1      Protein Total 8.0      Albumin 3.9      Globulin 4.1 (*)     Albumin/Globulin Ratio 1.0 (*)     Bilirubin Total 0.5      ALP 67      ALT 18      AST 17      eGFR >60      Anion Gap 7.0      BUN/Creatinine Ratio 14     CBC WITH DIFFERENTIAL - Abnormal    WBC 10.04      RBC 5.82      Hgb 15.3      Hct 47.2      MCV 81.1      MCH 26.3 (*)     MCHC 32.4 (*)     RDW 13.5      Platelet 239      MPV 10.2      IPF 5.3      Neut % 62.8      Lymph % 29.1      Mono % 6.4      Eos % 1.0      Basophil % 0.5      Imm Grans % 0.2      Neut # 6.31      Lymph # 2.92      Mono # 0.64      Eos # 0.10      Baso # 0.05      Imm Gran # 0.02      NRBC% 0.0     URINALYSIS, REFLEX TO URINE CULTURE - Abnormal    Color, UA Yellow      Appearance, UA Clear       Specific Gravity, UA 1.043 (*)     pH, UA 6.5      Protein, UA 1+ (*)     Glucose, UA Normal      Ketones, UA Trace (*)     Blood, UA Negative      Bilirubin, UA Negative      Urobilinogen, UA 1+ (*)     Nitrites, UA Negative      Leukocyte Esterase, UA Negative      RBC, UA None Seen      WBC, UA 0-5      Bacteria, UA None Seen      Squamous Epithelial Cells, UA None Seen      Mucous, UA Many (*)     Hyaline Casts, UA 0-2 (*)    BLOOD CULTURE [facility]   BLOOD CULTURE [facility]   CBC W/ AUTO DIFFERENTIAL    Narrative:     The following orders were created for panel order CBC auto differential.  Procedure                               Abnormality         Status                     ---------                               -----------         ------                     CBC with Differential[[id number]]       Abnormal            Final result                 Please view results for these tests on the individual orders.   EXTRA TUBES    Narrative:     The following orders were created for panel order EXTRA TUBES.  Procedure                               Abnormality         Status                     ---------                               -----------         ------                     Light Blue Top Hold[[id number]]                             Final result               Red Top Hold[[id number]]                                    Final result               Gold Top Hold[[id number]]                                   Final result                 Please view results for these tests on the individual orders.   LIGHT BLUE TOP HOLD    Extra Tube Hold for add-ons.     RED TOP HOLD    Extra Tube Hold for add-ons.     GOLD TOP HOLD    Extra Tube Hold for add-ons.     LACTIC ACID, PLASMA          Imaging Results              CT Abdomen Pelvis With IV Contrast NO Oral Contrast (Final result)  Result time 02/06/25 16:30:35      Final result by Isaac Eduardo MD (02/06/25 16:30:35)                   Impression:      Again question mild  sigmoid diverticulitis.  There is some perianal/perineal inflammation with 2-3 cm collection or phlegmon from 12-2 o'clock image 185 series 2.  Other findings as on recent prior report.      Electronically signed by: Isaac Eduardo  Date:    02/06/2025  Time:    16:30               Narrative:    EXAMINATION:  CT ABDOMEN PELVIS WITH IV CONTRAST    CLINICAL HISTORY:  Concern for perirectal abscess;    TECHNIQUE:  Helical acquisition through the abdomen and pelvis with IV contrast.  Three plane reconstructions were provided for review.  mGycm. Automatic exposure control, adjustment of mA/kV or iterative reconstruction technique was used to reduce radiation.    COMPARISON:  CT performed only 2 days prior.                                       Medications   vancomycin - pharmacy to dose (has no administration in time range)   iohexoL (OMNIPAQUE 350) 350 mg iodine/mL injection (100 mLs  Given 2/6/25 1600)     Medical Decision Making  Initial Assessment:       Rectal pain      Differential Diagnosis:   Judging by the patient's chief complaint and pertinent history, the patient has the following possible differential diagnoses, including but not limited to the following.  Some of these are deemed to be lower likelihood and some more likely based on my physical exam and history combined with possible lab work and/or imaging studies.   Please see the pertinent studies, and refer to the HPI.  Some of these diagnoses will take further evaluation to fully rule out, perhaps as an outpatient and the patient was encouraged to follow up when discharged for more comprehensive evaluation.      Rectal abscess, rectal pain, hemorrhoid, UTI, urolithiasis,  as well as multiple other possible etiologies        Problems Addressed:  Perianal abscess: acute illness or injury    Amount and/or Complexity of Data Reviewed  Labs: ordered.  Radiology: ordered.    Risk  Decision regarding hospitalization.               ED Course as of 02/06/25  1646   Thu Feb 06, 2025   1646 Spoke with general surgery resident.  We will come evaluate the patient. [BS]      ED Course User Index  [BS] Drew Trinh MD                           Clinical Impression:  Final diagnoses:  [K61.0] Perianal abscess (Primary)          ED Disposition Condition    Admit Stable                Drew Trinh MD  02/06/25 8328

## 2025-02-06 NOTE — Clinical Note
Diagnosis: Perianal abscess [502470]   Reason for IP Medical Treatment  (Clinical interventions that can only be accomplished in the IP setting? ) :: Perianal abscess

## 2025-02-06 NOTE — CONSULTS
[facility]  General Surgery - Purple Team  H&P Note  Admit Date: 2/6/2025  HD#0    Patient Name: Dilan Renteria Jr.  YOB: 1985  Date: 02/06/2025 5:04 PM  Date of Admission: 2/6/2025    PRESENTING HISTORY   Reason for Consult: Perirectal abscess     History of Present Illness:  40 y/o M with hx of dverticulitis and HTN. Patient reports he came to the emergency department about 2 days ago and was diagnosed with diverticulitis and prescribed PO abx. Today he presents with perirectal pain and some drainage. Patient reports that he has noted some drainage since a few months ago. He reports the rectal pain is significant and worsens when he has bowel movements. His last bowel movement was 2 days ago. He reports some chills but denies fever or any abdominal pain. Surgery hx is remarkable for previous ID of perirectal abscess. CT scan today remarkable for 2-3 cm perianal collection.     Review of Systems:  12 point ROS negative except as stated in HPI    PAST HISTORY:   Past medical history:  Past Medical History:   Diagnosis Date    Diverticulosis     HTN (hypertension)        Past surgical history:  Past Surgical History:   Procedure Laterality Date    COLONOSCOPY, WITH POLYPECTOMY USING SNARE N/A 1/18/2023    Procedure: COLONOSCOPY, WITH POLYPECTOMY USING SNARE;  Surgeon: Aliza Pulido MD;  Location: [facility] ENDOSCOPY;  Service: Gastroenterology;  Laterality: N/A;    EGD, WITH CLOSED BIOPSY N/A 1/18/2023    Procedure: EGD, WITH CLOSED BIOPSY;  Surgeon: Aliza Pulido MD;  Location: [facility] ENDOSCOPY;  Service: Gastroenterology;  Laterality: N/A;         Social history:  Tobacco: none  Alcohol: none  Drug use: marihuana     MEDICATIONS & ALLERGIES:   Home meds: atenolol, cetirizine, amlodipine    Allergies: NKDA    OBJECTIVE:   Vital Signs:  VITAL SIGNS: 24 HR MIN & MAX LAST   Temp  Min: 98.4 °F (36.9 °C)  Max: 98.4 °F (36.9 °C)  98.4 °F (36.9 °C)   BP  Min: 106/70  Max: 137/86   "120/64    Pulse  Min: 62  Max: 81  62    Resp  Min: 16  Max: 18  16    SpO2  Min: 98 %  Max: 99 %  99 %      HT: 6' 1" (185.4 cm)  WT: 134.3 kg (296 lb 1.2 oz)  BMI: 39.1     Intake/output:  none    Physical Exam:  General: Well developed, well nourished, no acute distress  HEENT: NCAT, PERRL  CV: RR  Resp: NWOB on room air  GI:  Soft, non tender, non distended  Rectal: no external lesions, no anal fissure, adequate rectal tone, tender to palpation on left rectal wall, no bleeding   :  Deferred  MSK: Moves all extremities spontaneously and purposefully  Skin/wounds:  No rashes, ulcers, erythema  Neuro:  CNII-XII grossly intact, A&Ox3    Labs:  Recent Labs     02/04/25  1015 02/06/25  1434   WBC 9.30 10.04   HGB 15.2 15.3   HCT 47.1 47.2    239    137   K 3.8 3.8    109*   CO2 24 21*   BUN 12.4 10.4   CREATININE 0.71* 0.75   BILITOT 0.4 0.5   AST 16 17   ALT 17 18   ALKPHOS 61 67   CALCIUM 9.1 9.1   ALBUMIN 4.0 3.9       Imaging:  CT Abdomen Pelvis With IV Contrast NO Oral Contrast   Final Result      Again question mild sigmoid diverticulitis.  There is some perianal/perineal inflammation with 2-3 cm collection or phlegmon from 12-2 o'clock image 185 series 2.  Other findings as on recent prior report.         Electronically signed by: Isaac Eduardo   Date:    02/06/2025   Time:    16:30         I have reviewed all pertinent imaging results/findings within the past 24 hours.    Micro/Path:  pending    ASSESSMENT & PLAN:    38 y/o M hx of HTN, diverticulitis and perianal abscess that presents with worsening rectal pain. CT remarkable for perianal/perineal inflammation with 2-3 cm collection or phlegmon from 12-2 o'clock.     - Admit patient to general surgery service  -NPO midnight for possible EUA in the morning if symptoms don't improve with abx management   - IV Zosyn  - Regular diet now  - DVT ppx: lovenox      Juan Feliz MD   [employer] General Surgery, PGY-1   "

## 2025-02-07 ENCOUNTER — ANESTHESIA EVENT (OUTPATIENT)
Dept: SURGERY | Facility: HOSPITAL | Age: 40
DRG: 395 | End: 2025-02-07
Payer: MEDICAID

## 2025-02-07 ENCOUNTER — ANESTHESIA (OUTPATIENT)
Dept: SURGERY | Facility: HOSPITAL | Age: 40
DRG: 395 | End: 2025-02-07
Payer: MEDICAID

## 2025-02-07 VITALS
HEIGHT: 73 IN | TEMPERATURE: 98 F | BODY MASS INDEX: 39.24 KG/M2 | WEIGHT: 296.06 LBS | SYSTOLIC BLOOD PRESSURE: 133 MMHG | RESPIRATION RATE: 16 BRPM | DIASTOLIC BLOOD PRESSURE: 85 MMHG | OXYGEN SATURATION: 99 % | HEART RATE: 72 BPM

## 2025-02-07 LAB
ALBUMIN SERPL-MCNC: 3.8 G/DL (ref 3.5–5)
ALBUMIN/GLOB SERPL: 1 RATIO (ref 1.1–2)
ALP SERPL-CCNC: 72 UNIT/L (ref 40–150)
ALT SERPL-CCNC: 18 UNIT/L (ref 0–55)
ANION GAP SERPL CALC-SCNC: 9 MEQ/L
AST SERPL-CCNC: 17 UNIT/L (ref 5–34)
BASOPHILS # BLD AUTO: 0.06 X10(3)/MCL
BASOPHILS NFR BLD AUTO: 0.7 %
BILIRUB SERPL-MCNC: 0.4 MG/DL
BUN SERPL-MCNC: 13.7 MG/DL (ref 8.9–20.6)
CALCIUM SERPL-MCNC: 9.4 MG/DL (ref 8.4–10.2)
CHLORIDE SERPL-SCNC: 106 MMOL/L (ref 98–107)
CO2 SERPL-SCNC: 25 MMOL/L (ref 22–29)
CREAT SERPL-MCNC: 0.85 MG/DL (ref 0.72–1.25)
CREAT/UREA NIT SERPL: 16
EOSINOPHIL # BLD AUTO: 0.14 X10(3)/MCL (ref 0–0.9)
EOSINOPHIL NFR BLD AUTO: 1.6 %
ERYTHROCYTE [DISTWIDTH] IN BLOOD BY AUTOMATED COUNT: 13.7 % (ref 11.5–17)
GFR SERPLBLD CREATININE-BSD FMLA CKD-EPI: >60 ML/MIN/1.73/M2
GLOBULIN SER-MCNC: 4 GM/DL (ref 2.4–3.5)
GLUCOSE SERPL-MCNC: 95 MG/DL (ref 74–100)
GRAM STN SPEC: NORMAL
GRAM STN SPEC: NORMAL
HCT VFR BLD AUTO: 45.5 % (ref 42–52)
HGB BLD-MCNC: 14.4 G/DL (ref 14–18)
IMM GRANULOCYTES # BLD AUTO: 0.02 X10(3)/MCL (ref 0–0.04)
IMM GRANULOCYTES NFR BLD AUTO: 0.2 %
KOH PREP SPEC: NORMAL
LYMPHOCYTES # BLD AUTO: 3.35 X10(3)/MCL (ref 0.6–4.6)
LYMPHOCYTES NFR BLD AUTO: 37.4 %
MAGNESIUM SERPL-MCNC: 2.1 MG/DL (ref 1.6–2.6)
MCH RBC QN AUTO: 25.9 PG (ref 27–31)
MCHC RBC AUTO-ENTMCNC: 31.6 G/DL (ref 33–36)
MCV RBC AUTO: 81.8 FL (ref 80–94)
MONOCYTES # BLD AUTO: 0.65 X10(3)/MCL (ref 0.1–1.3)
MONOCYTES NFR BLD AUTO: 7.3 %
NEUTROPHILS # BLD AUTO: 4.73 X10(3)/MCL (ref 2.1–9.2)
NEUTROPHILS NFR BLD AUTO: 52.8 %
NRBC BLD AUTO-RTO: 0 %
PHOSPHATE SERPL-MCNC: 3 MG/DL (ref 2.3–4.7)
PLATELET # BLD AUTO: 250 X10(3)/MCL (ref 130–400)
PMV BLD AUTO: 10.8 FL (ref 7.4–10.4)
POTASSIUM SERPL-SCNC: 3.5 MMOL/L (ref 3.5–5.1)
PROT SERPL-MCNC: 7.8 GM/DL (ref 6.4–8.3)
RBC # BLD AUTO: 5.56 X10(6)/MCL (ref 4.7–6.1)
SODIUM SERPL-SCNC: 140 MMOL/L (ref 136–145)
WBC # BLD AUTO: 8.95 X10(3)/MCL (ref 4.5–11.5)

## 2025-02-07 PROCEDURE — 25000003 PHARM REV CODE 250: Performed by: SPECIALIST

## 2025-02-07 PROCEDURE — 71000033 HC RECOVERY, INTIAL HOUR: Performed by: SURGERY

## 2025-02-07 PROCEDURE — 84100 ASSAY OF PHOSPHORUS: CPT

## 2025-02-07 PROCEDURE — 63600175 PHARM REV CODE 636 W HCPCS: Mod: JZ,TB | Performed by: SPECIALIST

## 2025-02-07 PROCEDURE — 96366 THER/PROPH/DIAG IV INF ADDON: CPT

## 2025-02-07 PROCEDURE — 36000704 HC OR TIME LEV I 1ST 15 MIN: Performed by: SURGERY

## 2025-02-07 PROCEDURE — 80053 COMPREHEN METABOLIC PANEL: CPT

## 2025-02-07 PROCEDURE — 83735 ASSAY OF MAGNESIUM: CPT

## 2025-02-07 PROCEDURE — 87205 SMEAR GRAM STAIN: CPT | Performed by: SURGERY

## 2025-02-07 PROCEDURE — 94761 N-INVAS EAR/PLS OXIMETRY MLT: CPT

## 2025-02-07 PROCEDURE — 96376 TX/PRO/DX INJ SAME DRUG ADON: CPT

## 2025-02-07 PROCEDURE — 85025 COMPLETE CBC W/AUTO DIFF WBC: CPT

## 2025-02-07 PROCEDURE — G0378 HOSPITAL OBSERVATION PER HR: HCPCS

## 2025-02-07 PROCEDURE — 25000003 PHARM REV CODE 250: Performed by: NURSE ANESTHETIST, CERTIFIED REGISTERED

## 2025-02-07 PROCEDURE — 25000003 PHARM REV CODE 250

## 2025-02-07 PROCEDURE — D9220A PRA ANESTHESIA: Mod: CRNA,,, | Performed by: NURSE ANESTHETIST, CERTIFIED REGISTERED

## 2025-02-07 PROCEDURE — 63600175 PHARM REV CODE 636 W HCPCS: Mod: JZ,TB

## 2025-02-07 PROCEDURE — 37000008 HC ANESTHESIA 1ST 15 MINUTES: Performed by: SURGERY

## 2025-02-07 PROCEDURE — D9220A PRA ANESTHESIA: Mod: ANES,,, | Performed by: SPECIALIST

## 2025-02-07 PROCEDURE — 36415 COLL VENOUS BLD VENIPUNCTURE: CPT

## 2025-02-07 PROCEDURE — 87070 CULTURE OTHR SPECIMN AEROBIC: CPT | Performed by: SURGERY

## 2025-02-07 PROCEDURE — 87102 FUNGUS ISOLATION CULTURE: CPT | Performed by: SURGERY

## 2025-02-07 PROCEDURE — 37000009 HC ANESTHESIA EA ADD 15 MINS: Performed by: SURGERY

## 2025-02-07 PROCEDURE — 87220 TISSUE EXAM FOR FUNGI: CPT | Performed by: SURGERY

## 2025-02-07 PROCEDURE — 63600175 PHARM REV CODE 636 W HCPCS: Mod: JZ,TB | Performed by: NURSE ANESTHETIST, CERTIFIED REGISTERED

## 2025-02-07 PROCEDURE — 36000705 HC OR TIME LEV I EA ADD 15 MIN: Performed by: SURGERY

## 2025-02-07 RX ORDER — PROCHLORPERAZINE EDISYLATE 5 MG/ML
5 INJECTION INTRAMUSCULAR; INTRAVENOUS ONCE AS NEEDED
Status: DISCONTINUED | OUTPATIENT
Start: 2025-02-07 | End: 2025-02-07

## 2025-02-07 RX ORDER — KETOROLAC TROMETHAMINE 30 MG/ML
INJECTION, SOLUTION INTRAMUSCULAR; INTRAVENOUS
Status: DISCONTINUED | OUTPATIENT
Start: 2025-02-07 | End: 2025-02-07

## 2025-02-07 RX ORDER — DIPHENHYDRAMINE HYDROCHLORIDE 50 MG/ML
25 INJECTION INTRAMUSCULAR; INTRAVENOUS ONCE AS NEEDED
Status: DISCONTINUED | OUTPATIENT
Start: 2025-02-07 | End: 2025-02-07

## 2025-02-07 RX ORDER — IPRATROPIUM BROMIDE AND ALBUTEROL SULFATE 2.5; .5 MG/3ML; MG/3ML
3 SOLUTION RESPIRATORY (INHALATION) ONCE AS NEEDED
Status: DISCONTINUED | OUTPATIENT
Start: 2025-02-07 | End: 2025-02-07

## 2025-02-07 RX ORDER — DEXMEDETOMIDINE HYDROCHLORIDE 100 UG/ML
INJECTION, SOLUTION INTRAVENOUS
Status: DISCONTINUED | OUTPATIENT
Start: 2025-02-07 | End: 2025-02-07

## 2025-02-07 RX ORDER — GABAPENTIN 300 MG/1
600 CAPSULE ORAL ONCE
Status: COMPLETED | OUTPATIENT
Start: 2025-02-07 | End: 2025-02-07

## 2025-02-07 RX ORDER — KETAMINE HCL IN 0.9 % NACL 50 MG/5 ML
SYRINGE (ML) INTRAVENOUS
Status: DISCONTINUED | OUTPATIENT
Start: 2025-02-07 | End: 2025-02-07

## 2025-02-07 RX ORDER — ONDANSETRON HYDROCHLORIDE 2 MG/ML
INJECTION, SOLUTION INTRAVENOUS
Status: DISCONTINUED | OUTPATIENT
Start: 2025-02-07 | End: 2025-02-07

## 2025-02-07 RX ORDER — DEXAMETHASONE SODIUM PHOSPHATE 4 MG/ML
INJECTION, SOLUTION INTRA-ARTICULAR; INTRALESIONAL; INTRAMUSCULAR; INTRAVENOUS; SOFT TISSUE
Status: DISCONTINUED | OUTPATIENT
Start: 2025-02-07 | End: 2025-02-07

## 2025-02-07 RX ORDER — TRAMADOL HYDROCHLORIDE 50 MG/1
50 TABLET ORAL
Status: COMPLETED | OUTPATIENT
Start: 2025-02-07 | End: 2025-02-07

## 2025-02-07 RX ORDER — SODIUM CHLORIDE, SODIUM LACTATE, POTASSIUM CHLORIDE, CALCIUM CHLORIDE 600; 310; 30; 20 MG/100ML; MG/100ML; MG/100ML; MG/100ML
INJECTION, SOLUTION INTRAVENOUS CONTINUOUS
Status: CANCELLED | OUTPATIENT
Start: 2025-02-07

## 2025-02-07 RX ORDER — FENTANYL CITRATE 50 UG/ML
INJECTION, SOLUTION INTRAMUSCULAR; INTRAVENOUS
Status: DISCONTINUED | OUTPATIENT
Start: 2025-02-07 | End: 2025-02-07

## 2025-02-07 RX ORDER — HYDROMORPHONE HYDROCHLORIDE 1 MG/ML
0.2 INJECTION, SOLUTION INTRAMUSCULAR; INTRAVENOUS; SUBCUTANEOUS EVERY 5 MIN PRN
Status: DISCONTINUED | OUTPATIENT
Start: 2025-02-07 | End: 2025-02-07

## 2025-02-07 RX ORDER — HYDROMORPHONE HYDROCHLORIDE 1 MG/ML
0.5 INJECTION, SOLUTION INTRAMUSCULAR; INTRAVENOUS; SUBCUTANEOUS EVERY 5 MIN PRN
Status: DISCONTINUED | OUTPATIENT
Start: 2025-02-07 | End: 2025-02-07

## 2025-02-07 RX ORDER — ONDANSETRON HYDROCHLORIDE 2 MG/ML
4 INJECTION, SOLUTION INTRAVENOUS ONCE
Status: DISCONTINUED | OUTPATIENT
Start: 2025-02-07 | End: 2025-02-07

## 2025-02-07 RX ORDER — ROCURONIUM BROMIDE 10 MG/ML
INJECTION, SOLUTION INTRAVENOUS
Status: DISCONTINUED | OUTPATIENT
Start: 2025-02-07 | End: 2025-02-07

## 2025-02-07 RX ORDER — PHENYLEPHRINE HYDROCHLORIDE 10 MG/ML
INJECTION INTRAVENOUS
Status: DISCONTINUED | OUTPATIENT
Start: 2025-02-07 | End: 2025-02-07

## 2025-02-07 RX ORDER — OXYCODONE AND ACETAMINOPHEN 5; 325 MG/1; MG/1
2 TABLET ORAL ONCE
Status: DISCONTINUED | OUTPATIENT
Start: 2025-02-07 | End: 2025-02-07

## 2025-02-07 RX ORDER — POLYETHYLENE GLYCOL 3350 17 G/17G
17 POWDER, FOR SOLUTION ORAL DAILY
Qty: 30 PACKET | Refills: 11 | Status: SHIPPED | OUTPATIENT
Start: 2025-02-07

## 2025-02-07 RX ORDER — LIDOCAINE HYDROCHLORIDE 20 MG/ML
INJECTION INTRAVENOUS
Status: DISCONTINUED | OUTPATIENT
Start: 2025-02-07 | End: 2025-02-07

## 2025-02-07 RX ORDER — OXYCODONE HYDROCHLORIDE 5 MG/1
5 TABLET ORAL EVERY 6 HOURS PRN
Qty: 14 TABLET | Refills: 0 | Status: SHIPPED | OUTPATIENT
Start: 2025-02-07

## 2025-02-07 RX ORDER — MEPERIDINE HYDROCHLORIDE 25 MG/ML
12.5 INJECTION INTRAMUSCULAR; INTRAVENOUS; SUBCUTANEOUS ONCE
Status: DISCONTINUED | OUTPATIENT
Start: 2025-02-07 | End: 2025-02-07

## 2025-02-07 RX ORDER — GLUCAGON 1 MG
1 KIT INJECTION
Status: DISCONTINUED | OUTPATIENT
Start: 2025-02-07 | End: 2025-02-07

## 2025-02-07 RX ORDER — MIDAZOLAM HYDROCHLORIDE 2 MG/2ML
2 INJECTION, SOLUTION INTRAMUSCULAR; INTRAVENOUS ONCE AS NEEDED
Status: DISCONTINUED | OUTPATIENT
Start: 2025-02-07 | End: 2025-02-07 | Stop reason: HOSPADM

## 2025-02-07 RX ORDER — ACETAMINOPHEN 500 MG
1000 TABLET ORAL
Status: COMPLETED | OUTPATIENT
Start: 2025-02-07 | End: 2025-02-07

## 2025-02-07 RX ORDER — PROPOFOL 10 MG/ML
VIAL (ML) INTRAVENOUS
Status: DISCONTINUED | OUTPATIENT
Start: 2025-02-07 | End: 2025-02-07

## 2025-02-07 RX ORDER — AMOXICILLIN AND CLAVULANATE POTASSIUM 875; 125 MG/1; MG/1
1 TABLET, FILM COATED ORAL EVERY 12 HOURS
Qty: 14 TABLET | Refills: 0 | Status: SHIPPED | OUTPATIENT
Start: 2025-02-07

## 2025-02-07 RX ADMIN — FENTANYL CITRATE 50 MCG: 50 INJECTION INTRAMUSCULAR; INTRAVENOUS at 11:02

## 2025-02-07 RX ADMIN — HYDROMORPHONE HYDROCHLORIDE 0.5 MG: 1 INJECTION, SOLUTION INTRAMUSCULAR; INTRAVENOUS; SUBCUTANEOUS at 12:02

## 2025-02-07 RX ADMIN — Medication 30 MG: at 11:02

## 2025-02-07 RX ADMIN — PHENYLEPHRINE HYDROCHLORIDE 200 MCG: 10 INJECTION INTRAVENOUS at 11:02

## 2025-02-07 RX ADMIN — ROCURONIUM BROMIDE 70 MG: 10 INJECTION INTRAVENOUS at 11:02

## 2025-02-07 RX ADMIN — PIPERACILLIN AND TAZOBACTAM 4.5 G: 4; .5 INJECTION, POWDER, LYOPHILIZED, FOR SOLUTION INTRAVENOUS; PARENTERAL at 02:02

## 2025-02-07 RX ADMIN — ACETAMINOPHEN 1000 MG: 500 TABLET, FILM COATED ORAL at 11:02

## 2025-02-07 RX ADMIN — DEXAMETHASONE SODIUM PHOSPHATE 8 MG: 4 INJECTION, SOLUTION INTRA-ARTICULAR; INTRALESIONAL; INTRAMUSCULAR; INTRAVENOUS; SOFT TISSUE at 11:02

## 2025-02-07 RX ADMIN — PIPERACILLIN AND TAZOBACTAM 4.5 G: 4; .5 INJECTION, POWDER, LYOPHILIZED, FOR SOLUTION INTRAVENOUS; PARENTERAL at 10:02

## 2025-02-07 RX ADMIN — PHENYLEPHRINE HYDROCHLORIDE 200 MCG: 10 INJECTION INTRAVENOUS at 12:02

## 2025-02-07 RX ADMIN — TRAMADOL HYDROCHLORIDE 50 MG: 50 TABLET, COATED ORAL at 11:02

## 2025-02-07 RX ADMIN — GABAPENTIN 600 MG: 300 CAPSULE ORAL at 11:02

## 2025-02-07 RX ADMIN — PROPOFOL 300 MG: 10 INJECTION, EMULSION INTRAVENOUS at 11:02

## 2025-02-07 RX ADMIN — LIDOCAINE HYDROCHLORIDE 100 MG: 20 INJECTION INTRAVENOUS at 11:02

## 2025-02-07 RX ADMIN — OXYCODONE HYDROCHLORIDE 10 MG: 5 TABLET ORAL at 03:02

## 2025-02-07 RX ADMIN — DEXMEDETOMIDINE 30 MCG: 200 INJECTION, SOLUTION INTRAVENOUS at 11:02

## 2025-02-07 RX ADMIN — SODIUM CHLORIDE: 9 INJECTION, SOLUTION INTRAVENOUS at 11:02

## 2025-02-07 RX ADMIN — ONDANSETRON 4 MG: 2 INJECTION INTRAMUSCULAR; INTRAVENOUS at 11:02

## 2025-02-07 RX ADMIN — KETOROLAC TROMETHAMINE 30 MG: 30 INJECTION, SOLUTION INTRAMUSCULAR at 11:02

## 2025-02-07 RX ADMIN — KETOROLAC TROMETHAMINE 15 MG: 30 INJECTION, SOLUTION INTRAMUSCULAR at 06:02

## 2025-02-07 RX ADMIN — SUGAMMADEX 600 MG: 100 INJECTION, SOLUTION INTRAVENOUS at 11:02

## 2025-02-07 NOTE — PLAN OF CARE
02/07/25 1207   Discharge Assessment   Assessment Type Discharge Planning Assessment   Confirmed/corrected address, phone number and insurance Yes   Confirmed Demographics Correct on Facesheet   Source of Information family   When was your last doctors appointment?   (No pcp)   Reason For Admission K61.0 Perianal abscess   People in Home significant other   Do you expect to return to your current living situation? Yes   Do you have help at home or someone to help you manage your care at home? Yes   Who are your caregiver(s) and their phone number(s)? Kerri Renteria (Spouse)  [phone]   Prior to hospitilization cognitive status: Alert/Oriented   Current cognitive status: Alert/Oriented   Walking or Climbing Stairs Difficulty no   Dressing/Bathing Difficulty no   Equipment Currently Used at Home none   Readmission within 30 days? No   Patient currently being followed by outpatient case management? No   Do you currently have service(s) that help you manage your care at home? No   Do you take prescription medications? Yes   Do you have prescription coverage? Yes   Coverage Medicaid   Do you have any problems affording any of your prescribed medications? No   Is the patient taking medications as prescribed? yes   Who is going to help you get home at discharge? Kerri Renteria (Spouse)  [phone]   How do you get to doctors appointments? family or friend will provide   Are you on dialysis? No   Discharge Plan A Home with family   DME Needed Upon Discharge  none   Discharge Plan discussed with: Spouse/sig other   Name(s) and Number(s) Kerri Renteria (Spouse)  [phone]   Transition of Care Barriers None   Physical Activity   On average, how many days per week do you engage in moderate to strenuous exercise (like a brisk walk)? 0 days   On average, how many minutes do you engage in exercise at this level? 0 min   Financial Resource Strain   How hard is it for you to pay for the very basics like food, housing, medical  care, and heating? Not very   Housing Stability   In the last 12 months, was there a time when you were not able to pay the mortgage or rent on time? N   At any time in the past 12 months, were you homeless or living in a shelter (including now)? N   Transportation Needs   Has the lack of transportation kept you from medical appointments, meetings, work or from getting things needed for daily living? No   Food Insecurity   Within the past 12 months, you worried that your food would run out before you got the money to buy more. Never true   Within the past 12 months, the food you bought just didn't last and you didn't have money to get more. Never true   Stress   Do you feel stress - tense, restless, nervous, or anxious, or unable to sleep at night because your mind is troubled all the time - these days? Not at all   Social Isolation   How often do you feel lonely or isolated from those around you?  Never   Alcohol Use   Q1: How often do you have a drink containing alcohol? Never   Q2: How many drinks containing alcohol do you have on a typical day when you are drinking? None   Q3: How often do you have six or more drinks on one occasion? Never   Utilities   In the past 12 months has the electric, gas, oil, or water company threatened to shut off services in your home? No   Health Literacy   How often do you need to have someone help you when you read instructions, pamphlets, or other written material from your doctor or pharmacy? Never   OTHER   Name(s) of People in Home Kerri Renteria (Spouse)  [phone]

## 2025-02-07 NOTE — ANESTHESIA PROCEDURE NOTES
Intubation    Date/Time: 2/7/2025 11:21 AM    Performed by: Eulalio Bardales CRNA  Authorized by: Desiree Avalos MD    Intubation:     Induction:  Intravenous    Intubated:  Postinduction    Mask Ventilation:  Easy with oral airway    Attempts:  1    Attempted By:  CRNA    Method of Intubation:  Direct    Blade:  Denis 2    Laryngeal View Grade: Grade IIA - cords partially seen      Difficult Airway Encountered?: No      Complications:  None    Airway Device:  Oral endotracheal tube    Airway Device Size:  7.5    Style/Cuff Inflation:  Cuffed (inflated to minimal occlusive pressure)    Inflation Amount (mL):  6    Tube secured:  21    Secured at:  The lips    Placement Verified By:  Capnometry    Complicating Factors:  None    Findings Post-Intubation:  BS equal bilateral

## 2025-02-07 NOTE — ANESTHESIA PREPROCEDURE EVALUATION
02/07/2025  Dilan Renteria Jr. is a 39 y.o., male for I and D, EUA history of perianal abscess    Vitals:    02/07/25 0335 02/07/25 0407 02/07/25 0656 02/07/25 0740   BP:  115/70 114/63    Pulse:  77 78    Resp: 18 18 17    Temp:  37.4 °C (99.3 °F) 37.1 °C (98.7 °F)    TempSrc:  Oral Oral    SpO2:  97% 97% 97%   Weight:       Height:             PMH of HTN and Diverticulitis, admitted on 2.6.25    Zosyn due @ 1045    Active Ambulatory Problems     Diagnosis Date Noted    Diverticulitis of sigmoid colon 11/08/2022    Perirectal abscess 11/08/2022    Gastroesophageal reflux disease 11/08/2022     Resolved Ambulatory Problems     Diagnosis Date Noted    No Resolved Ambulatory Problems     Past Medical History:   Diagnosis Date    Diverticulosis     HTN (hypertension)        Past Surgical History:   Procedure Laterality Date    COLONOSCOPY, WITH POLYPECTOMY USING SNARE N/A 1/18/2023    Procedure: COLONOSCOPY, WITH POLYPECTOMY USING SNARE;  Surgeon: Aliza Pulido MD;  Location: [facility] ENDOSCOPY;  Service: Gastroenterology;  Laterality: N/A;    EGD, WITH CLOSED BIOPSY N/A 1/18/2023    Procedure: EGD, WITH CLOSED BIOPSY;  Surgeon: Aliza Pulido MD;  Location: [facility] ENDOSCOPY;  Service: Gastroenterology;  Laterality: N/A;         Lab Results   Component Value Date    WBC 8.95 02/07/2025    HGB 14.4 02/07/2025    HCT 45.5 02/07/2025     02/07/2025    ALT 18 02/07/2025    AST 17 02/07/2025     02/07/2025    K 3.5 02/07/2025     02/07/2025    CREATININE 0.85 02/07/2025    BUN 13.7 02/07/2025    CO2 25 02/07/2025       No current facility-administered medications on file prior to encounter.     Current Outpatient Medications on File Prior to Encounter   Medication Sig Dispense Refill    ALPRAZolam (XANAX) 0.5 MG tablet Take 0.5 mg by mouth every evening.      amLODIPine (NORVASC) 10  MG tablet Take 10 mg by mouth once daily.      amoxicillin-clavulanate 875-125mg (AUGMENTIN) 875-125 mg per tablet Take 1 tablet by mouth 2 (two) times daily. for 5 days 10 tablet 0    atenoloL (TENORMIN) 25 MG tablet Take 1 tablet (25 mg total) by mouth once daily. 30 tablet 0    cetirizine (ZYRTEC) 10 MG tablet Take 1 tablet (10 mg total) by mouth once daily. 30 tablet 0    cyclobenzaprine (FLEXERIL) 5 MG tablet Take 1 tablet (5 mg total) by mouth 3 (three) times daily as needed for Muscle spasms. 15 tablet 0    esomeprazole (NEXIUM) 40 MG capsule Take 1 capsule (40 mg total) by mouth before breakfast. 90 capsule 3    fluticasone propionate (FLONASE) 50 mcg/actuation nasal spray 1 spray (50 mcg total) by Each Nostril route 2 (two) times daily as needed for Rhinitis. 15 g 0    ondansetron (ZOFRAN-ODT) 4 MG TbDL Take 1 tablet (4 mg total) by mouth every 8 (eight) hours as needed (Nausea). 9 tablet 0           Pre-op Assessment    I have reviewed the Patient Summary Reports.     I have reviewed the Nursing Notes. I have reviewed the NPO Status.   I have reviewed the Medications.     Review of Systems  Anesthesia Hx:  No problems with previous Anesthesia   History of prior surgery of interest to airway management or planning:          Denies Family Hx of Anesthesia complications.    Denies Personal Hx of Anesthesia complications.                    Hematology/Oncology:  Hematology Normal   Oncology Normal                                   EENT/Dental:  EENT/Dental Normal           Cardiovascular:  Cardiovascular Normal                                              Pulmonary:  Pulmonary Normal                       Renal/:  Renal/ Normal                 Hepatic/GI:  Hepatic/GI Normal                    Musculoskeletal:  Musculoskeletal Normal                Neurological:  Neurology Normal                                      Endocrine:  Endocrine Normal            Dermatological:  Skin Normal     Psych:  Psychiatric Normal                  Physical Exam  General: Cooperative, Well nourished, Alert and Oriented    Airway:  Mallampati: II / III/ II  Mouth Opening: Normal  TM Distance: Normal  Tongue: Normal  Neck ROM: Normal ROM    Anesthesia Plan  Type of Anesthesia, risks & benefits discussed:    Anesthesia Type: Gen ETT  Intra-op Monitoring Plan: Standard ASA Monitors  Post Op Pain Control Plan: multimodal analgesia and IV/PO Opioids PRN  Induction:  IV  Airway Plan: Direct  Informed Consent: Informed consent signed with the Patient and all parties understand the risks and agree with anesthesia plan.  All questions answered. Patient consented to blood products? No  ASA Score: 2  Day of Surgery Review of History & Physical: H&P Update referred to the surgeon/provider.    Ready For Surgery From Anesthesia Perspective.   .

## 2025-02-07 NOTE — TRANSFER OF CARE
"Anesthesia Transfer of Care Note    Patient: Dilan Renteria Jr.    Procedure(s) Performed: Procedure(s) (LRB):  Exam under anesthesia, possible I&D, possible seton placement (N/A)    Patient location: PACU    Anesthesia Type: general    Transport from OR: Transported from OR on room air with adequate spontaneous ventilation    Post pain: adequate analgesia    Post assessment: no apparent anesthetic complications    Post vital signs: stable    Level of consciousness: sedated    Nausea/Vomiting: no nausea/vomiting    Complications: none    Transfer of care protocol was followed      Last vitals: Visit Vitals  /77   Pulse 72   Temp 36.3 °C (97.3 °F) (Temporal)   Resp 20   Ht 6' 1" (1.854 m)   Wt 134.3 kg (296 lb 1.2 oz)   SpO2 100%   BMI 39.06 kg/m²     "

## 2025-02-07 NOTE — MEDICAL/APP STUDENT
"  [facility]  General Surgery - Purple Team  Progress Note  Admit Date: 2/6/2025  HD#1  POD#* No surgery found *    Subjective:   Interval history:  NAEON. VSS. AF.     Pt reports the pain is still a 10/10. Pt describes the pain as a tearing sensation in in butt radiating to testicles. Pt reports the pain is not controlled; most recent oxycodone 10 was at 4AM, which did not provide relief. Pt reports he had a BM yesterday with significant pain. Pt denies any bleeding or discharge from his rectum. Pt denies any fever, chills, n/v/d, constipation, CP, SOB. Pt has been NPO since midnight.      Objective:     VITAL SIGNS: 24 HR MIN & MAX LAST   Temp  Min: 98.1 °F (36.7 °C)  Max: 99.3 °F (37.4 °C)  99.3 °F (37.4 °C)   BP  Min: 106/70  Max: 137/86  115/70    Pulse  Min: 62  Max: 87  77    Resp  Min: 16  Max: 20  18    SpO2  Min: 96 %  Max: 99 %  97 %      HT: 6' 1" (185.4 cm)  WT: 134.3 kg (296 lb 1.2 oz)  BMI: 39.1     Intake/output:  Voiding spontaneously.     Physical examination:  Gen: NAD, answering questions appropriately  CV: RR  Resp: Clear to auscultation bilaterally.   Abd: S/NT/ND  Ext: moving all extremities spontaneously and purposefully.  Rectal: Unable to perform an exam, due to pain.      Labs:  Recent Labs     02/04/25  1015 02/06/25  1434 02/07/25  0328   WBC 9.30 10.04 8.95   HGB 15.2 15.3 14.4   HCT 47.1 47.2 45.5    239 250    137 140   K 3.8 3.8 3.5    109* 106   CO2 24 21* 25   BUN 12.4 10.4 13.7   CREATININE 0.71* 0.75 0.85   BILITOT 0.4 0.5 0.4   AST 16 17 17   ALT 17 18 18   ALKPHOS 61 67 72   CALCIUM 9.1 9.1 9.4   ALBUMIN 4.0 3.9 3.8   MG  --   --  2.10   PHOS  --   --  3.0       Imaging:  CT ABDOMEN PELVIS WITH IV CONTRAST (2/6/25):  Again question mild sigmoid diverticulitis. There is some perianal/perineal inflammation with 2-3 cm collection or phlegmon from 12-2 o'clock image 185 series 2. Other findings as on recent prior report.     Assessment & " Plan:   40 y/o M hx of HTN, diverticulitis and perianal abscess that presents with worsening rectal pain. CT remarkable for perianal/perineal inflammation with 2-3 cm collection or phlegmon from 12-2 o'clock. Pt is still in severe pain, which is unrelieved by pain control. NPO since midnight.      - NPO midnight for EUA today with possible I&D, possible drain placement.   - IV Zosyn  - NPO after midnight  - DVT ppx: lovenox      DVT ppx: enoxaparin     Santy Enriquez, MS3  [employer]

## 2025-02-07 NOTE — OP NOTE
[facility]  Surgery Department  Operative Note    SUMMARY     Date of Procedure: 2/7/2025     Procedure: Procedure(s) (LRB):  Exam under anesthesia, possible I&D, possible seton placement (N/A)     Surgeons and Role:     * Roge Meier Jr., MD - Primary  Layla Butler MD PGY5     * Juan Wen MD - Resident - Assisting    Pre-Operative Diagnosis:     Post-Operative Diagnosis: Post-Op Diagnosis Codes:     * Perianal abscess [K61.0]    Anesthesia: general    Operative Findings (including complications, if any): song-rectal abscess drainage in OR with purulent return    Description of Technical Procedures:   After informed consents were verified, patient was brought to the operating room and placed on the operating table in supine position.  General anesthesia was induced.  Patient was placed in lithotomy position at this point, and was prepped and draped in the usual sterile fashion.  A standard time-out was carried out.  Digital rectal exam was performed with some fullness induration noted left posterolateral region.  There were no actively draining areas on the skin.  An 18 gauge needle was advanced into this area which correlated to the fluid collection on the CT scan.  Purulent fluid was returned the syringe.  This was sent off for culture.  Eleven blade scalpel was used to make an incision along the needle tract.  This incision was then explored with hemostats and we noted further purulent drainage.  On repeat rectal exam, the area of induration and fullness was noticeably smaller.  The abscess cavity was then thoroughly irrigated, and then lightly packed with iodoform.  The patient tolerated the procedure well with no immediate complications.  All counts were correct x2.  Dr. Meier was available for the entirety of the case.    Significant Surgical Tasks Conducted by the Assistant(s), if Applicable: n/a    Estimated Blood Loss (EBL): 2 mL            Implants: * No implants in log *    Specimens:   Specimen (24h ago, onward)      None                    Condition: Stable    Disposition: PACU - hemodynamically stable.    Attestation: Op Note Attestation: Dr. Meier was available for the entirety of the case.

## 2025-02-07 NOTE — PROGRESS NOTES
Inpatient Nutrition Evaluation    Admit Date: 2/6/2025   Total duration of encounter: 1 day   Patient Age: 39 y.o.    Nutrition Recommendation/Prescription     Regular diet as tolerated  Monitor Weight Weekly     Nutrition Assessment     Chart Review    Reason Seen: continuous nutrition monitoring    Malnutrition Screening Tool Results   Have you recently lost weight without trying?: No  Have you been eating poorly because of a decreased appetite?: No   MST Score: 0   Diagnosis:  Perianal abscess    Relevant Medical History: HTN, Perianal abscess, Diverticulitis    Scheduled Medications:  enoxparin, 40 mg, Daily  piperacillin-tazobactam (Zosyn) IV (PEDS and ADULTS) (extended infusion is not appropriate), 4.5 g, Q8H    Continuous Infusions:   PRN Medications:   Current Facility-Administered Medications:     acetaminophen, 650 mg, Oral, Q8H PRN    ketorolac, 15 mg, Intravenous, Q6H PRN    melatonin, 6 mg, Oral, Nightly PRN    midazolam, 2 mg, Intravenous, Once PRN    oxyCODONE, 10 mg, Oral, Q4H PRN    oxyCODONE, 5 mg, Oral, Q4H PRN    sodium chloride 0.9%, 10 mL, Intravenous, PRN    Recent Labs   Lab 02/04/25  1015 02/06/25  1434 02/07/25  0328    137 140   K 3.8 3.8 3.5   CALCIUM 9.1 9.1 9.4   PHOS  --   --  3.0   MG  --   --  2.10   CO2 24 21* 25   BUN 12.4 10.4 13.7   CREATININE 0.71* 0.75 0.85   EGFRNORACEVR >60 >60 >60   GLUCOSE 90 116* 95   BILITOT 0.4 0.5 0.4   ALKPHOS 61 67 72   ALT 17 18 18   AST 16 17 17   ALBUMIN 4.0 3.9 3.8   LIPASE 26  --   --    WBC 9.30 10.04 8.95   HGB 15.2 15.3 14.4   HCT 47.1 47.2 45.5     Nutrition Orders:  Diet NPO      Appetite/Oral Intake: good/NPO  Factors Affecting Nutritional Intake: NPO  Social Needs Impacting Access to Food: none identified  Food/Rastafarian/Cultural Preferences:  no tomato  Food Allergies: no known food allergies  Last Bowel Movement: 02/06/25  Wound(s):  none reported    Comments    2/7/25 -- Pt NPO for possible I&D of perianal abscess; Pt reports good  "appetite, denies n/v/d; Denies weight loss reporting UBW as ~ 300 lb    Anthropometrics    Height: 6' 1" (185.4 cm), Height Method: Stated  Last Weight: 134.3 kg (296 lb 1.2 oz) (25 1333), Weight Method: Standard Scale  BMI (Calculated): 39.1  BMI Classification: obese grade II (BMI 35-39.9)        Ideal Body Weight (IBW), Male: 184 lb     % Ideal Body Weight, Male (lb): 160.91 %                 Usual Body Weight (UBW), k kg  % Usual Body Weight: 98.96     Usual Weight Provided By: patient and EMR weight history    Wt Readings from Last 5 Encounters:   25 134.3 kg (296 lb 1.2 oz)   25 (!) 137 kg (302 lb 0.5 oz)   24 136.1 kg (300 lb)   23 (!) 139 kg (306 lb 7 oz)   23 (!) 138.4 kg (305 lb 1.9 oz)     Weight Change(s) Since Admission:   Wt Readings from Last 1 Encounters:   25 1333 134.3 kg (296 lb 1.2 oz)   Admit Weight: 134.3 kg (296 lb 1.2 oz) (25 1333), Weight Method: Standard Scale    Patient Education     Not applicable.    Nutrition Goals & Monitoring     Dietitian will monitor: food and beverage intake, weight, and gastrointestinal profile  Discharge planning: resume home regimen  Nutrition Risk/Follow-Up: low (follow-up in 5-7 days)  Patients assigned 'low nutrition risk' status do not qualify for a full nutritional assessment but will be monitored and re-evaluated in a 5-7 day time period. Please consult if re-evaluation needed sooner.   "

## 2025-02-07 NOTE — PROGRESS NOTES
"  [facility]  General Surgery - Purple Team  Progress Note  Admit Date: 2/6/2025  HD#1  POD#* No surgery found *    Subjective:   Interval history:  NAEON. VSS. AF.     Pt reports the pain is still a 10/10. Pt describes the pain as a tearing sensation in butt radiating to testicles. Pt reports the pain is not controlled; most recent oxycodone 10 was at 4AM, which did not provide relief. Pt reports he had a BM yesterday with significant pain. Pt denies any bleeding or discharge from his rectum. Pt denies any fever, chills, n/v/d, constipation, CP, SOB. Pt has been NPO since midnight.      Objective:     VITAL SIGNS: 24 HR MIN & MAX LAST   Temp  Min: 98.1 °F (36.7 °C)  Max: 99.3 °F (37.4 °C)  98.7 °F (37.1 °C)   BP  Min: 106/70  Max: 137/86  114/63    Pulse  Min: 62  Max: 87  78    Resp  Min: 16  Max: 20  17    SpO2  Min: 96 %  Max: 99 %  97 %      HT: 6' 1" (185.4 cm)  WT: 134.3 kg (296 lb 1.2 oz)  BMI: 39.1     Intake/output:  Voiding spontaneously.     Physical examination:  Gen: NAD, answering questions appropriately  CV: RR  Resp: Clear to auscultation bilaterally.   Abd: Soft, non tender, non distended  Ext: moving all extremities spontaneously and purposefully.  Rectal: Unable to perform exam this morning due to significant pain      Labs:  Recent Labs     02/04/25  1015 02/06/25  1434 02/07/25  0328   WBC 9.30 10.04 8.95   HGB 15.2 15.3 14.4   HCT 47.1 47.2 45.5    239 250    137 140   K 3.8 3.8 3.5    109* 106   CO2 24 21* 25   BUN 12.4 10.4 13.7   CREATININE 0.71* 0.75 0.85   BILITOT 0.4 0.5 0.4   AST 16 17 17   ALT 17 18 18   ALKPHOS 61 67 72   CALCIUM 9.1 9.1 9.4   ALBUMIN 4.0 3.9 3.8   MG  --   --  2.10   PHOS  --   --  3.0       Imaging:  CT ABDOMEN PELVIS WITH IV CONTRAST (2/6/25):  Again question mild sigmoid diverticulitis. There is some perianal/perineal inflammation with 2-3 cm collection or phlegmon from 12-2 o'clock image 185 series 2. Other findings as on " recent prior report.     Assessment & Plan:   40 y/o M hx of HTN, diverticulitis and perianal abscess that presents with worsening rectal pain. CT remarkable for perianal/perineal inflammation with 2-3 cm collection or phlegmon from 12-2 o'clock. Pt is still in severe pain, which is unrelieved by pain control. NPO since midnight.      - Proceed to OR today for EUA, possible I&D, possible drain placement   - Continue IV Zosyn  - Has been NPO after midnight  - DVT ppx: lovenox      DVT ppx: Lovenox    Santy Enriquez, MS3  [facility] [employer]     I have seen and examined the patient with the medical student. I agree with the assessment and plan and have made the appropriate edits to the note above.      Juan Feliz MD  [facility] General Surgery PGY-1

## 2025-02-07 NOTE — DISCHARGE SUMMARY
[facility]  General Surgery  Discharge Summary      Patient Name: Dilan Renteria Jr.  MRN: [id number]  Admission Date: 2/6/2025  Hospital Length of Stay: 1 days  Discharge Date and Time:  02/07/2025 4:09 PM  Attending Physician: Roge Meier Jr.,*   Discharging Provider: Brayan Calle MD  Primary Care Provider: Yelitza, Primary Doctor     HPI:   40 y/o M with hx of diverticulitis and HTN. Patient reports he came to the emergency department about 2 days ago and was diagnosed with diverticulitis and prescribed PO abx. Today he presents with perirectal pain and some drainage. Patient reports that he has noted some drainage since a few months ago. He reports the rectal pain is significant and worsens when he has bowel movements. His last bowel movement was 2 days ago. He reports some chills but denies fever or any abdominal pain. Surgery hx is remarkable for previous ID of perirectal abscess. CT scan today remarkable for 2-3 cm perianal collection.     Procedure(s) (LRB):  Exam under anesthesia, possible I&D, possible seton placement (N/A)     Hospital Course: Pt was admitted for IV antibiotics and EUA for perirectal abscess drainage. He underwent drainage without issue and packing was left in place. On POD0 his pain was much improved and he wanted to go home. He was stable for discharge and was sent PO Abx, instructions to remove packing on 2/9/25 and close clinic follow up on 2/13/25.    Consults:   Consults (From admission, onward)          Status Ordering Provider     Inpatient consult to General surgery  Once        Provider:  Roge Meier Jr., MD    Completed SUZIE CULLEN            Significant Diagnostic Studies: Radiology    Pending Diagnostic Studies:       None          Final Active Diagnoses:    Diagnosis Date Noted POA    PRINCIPAL PROBLEM:  Perirectal abscess [K61.1] 11/08/2022 Yes      Problems Resolved During this Admission:      Discharged Condition:  good    Disposition: Home or Self Care    Follow Up:   Follow-up Information       [facility] Follow up on 2/13/2025.    Specialty: General Surgery  Contact information:  [address] [place] [id number] [phone]                         Patient Instructions:      Remove dressing in 48 hours   Order Comments: Remove packing Sunday morning. Do not repack. Continue antibiotics by mouth. If your pain is worsening please call the clinic as we may need to alter your antibiotic regimen.     Notify your health care provider if you experience any of the following:  temperature >100.4     Notify your health care provider if you experience any of the following:  persistent nausea and vomiting or diarrhea     Notify your health care provider if you experience any of the following:  severe uncontrolled pain     Notify your health care provider if you experience any of the following:  redness, tenderness, or signs of infection (pain, swelling, redness, odor or green/yellow discharge around incision site)     Medications:  Augmentin, Oxycodone, Metamucil, Miralax    Brayan Calle MD  General Surgery  [facility]

## 2025-02-07 NOTE — PLAN OF CARE
Problem: Adult Inpatient Plan of Care  Goal: Plan of Care Review  Outcome: Progressing  Goal: Patient-Specific Goal (Individualized)  Outcome: Progressing  Goal: Absence of Hospital-Acquired Illness or Injury  Outcome: Progressing  Goal: Optimal Comfort and Wellbeing  Outcome: Progressing  Goal: Readiness for Transition of Care  Outcome: Progressing     Problem: Adult Inpatient Plan of Care  Goal: Plan of Care Review  Outcome: Progressing     Problem: Adult Inpatient Plan of Care  Goal: Plan of Care Review  Outcome: Progressing     Problem: Adult Inpatient Plan of Care  Goal: Plan of Care Review  Outcome: Progressing  Goal: Patient-Specific Goal (Individualized)  Outcome: Progressing  Goal: Absence of Hospital-Acquired Illness or Injury  Outcome: Progressing  Goal: Optimal Comfort and Wellbeing  Outcome: Progressing  Goal: Readiness for Transition of Care  Outcome: Progressing

## 2025-02-07 NOTE — ANESTHESIA PROCEDURE NOTES
Intubation    Date/Time: 2/7/2025 11:21 AM    Performed by: Eulalio Bardales CRNA  Authorized by: Desiree Avalos MD    Intubation:     Induction:  Intravenous    Intubated:  Postinduction    Mask Ventilation:  Easy with oral airway    Attempts:  1    Attempted By:  CRNA    Method of Intubation:  Direct    Blade:  Denis 3    Laryngeal View Grade: Grade IIA - cords partially seen      Difficult Airway Encountered?: No      Complications:  None    Airway Device:  Oral endotracheal tube    Airway Device Size:  8.0    Style/Cuff Inflation:  Cuffed (inflated to minimal occlusive pressure)    Inflation Amount (mL):  6    Tube secured:  23    Secured at:  The lips    Placement Verified By:  Capnometry    Complicating Factors:  None    Findings Post-Intubation:  BS equal bilateral and atraumatic/condition of teeth unchanged

## 2025-02-07 NOTE — ANESTHESIA POSTPROCEDURE EVALUATION
Anesthesia Post Evaluation    Patient: Dilan Renteria Jr.    Procedure(s) Performed: Procedure(s) (LRB):  Exam under anesthesia, possible I&D, possible seton placement (N/A)    Final Anesthesia Type: general      Patient location during evaluation: PACU  Patient participation: Yes- Able to Participate  Level of consciousness: awake and responds to stimulation  Post-procedure vital signs: reviewed and stable  Pain management: adequate  Airway patency: patent    PONV status at discharge: No PONV  Anesthetic complications: no      Cardiovascular status: blood pressure returned to baseline  Respiratory status: unassisted  Hydration status: euvolemic  Follow-up not needed.          Vitals Value Taken Time   BP 95/59 02/07/25 1210   Temp 36 °C (96.8 °F) 02/07/25 1205   Pulse 71 02/07/25 1210   Resp 13 02/07/25 1210   SpO2 94 % 02/07/25 1210         No case tracking events are documented in the log.      Pain/Ozzie Score: Pain Rating Prior to Med Admin: 0 (2/7/2025 11:05 AM)  Pain Rating Post Med Admin: 4 (2/7/2025  4:35 AM)          
Anesthesia Post Evaluation    Patient: Dilan Renteria Jr.    Procedure(s) Performed: Procedure(s) (LRB):  Exam under anesthesia, possible I&D, possible seton placement (N/A)    [facility] Anesthesia Post Op Evaluation      Vitals Value Taken Time   BP 95/59 02/07/25 1210   Temp 36 °C (96.8 °F) 02/07/25 1205   Pulse 71 02/07/25 1210   Resp 13 02/07/25 1210   SpO2 94 % 02/07/25 1210         No case tracking events are documented in the log.      Pain/Ozzie Score: Pain Rating Prior to Med Admin: 0 (2/7/2025 11:05 AM)  Pain Rating Post Med Admin: 4 (2/7/2025  4:35 AM)          
Plan: Sunscreen SPF 45+
Detail Level: Zone

## 2025-02-11 LAB
BACTERIA BLD CULT: NORMAL
BACTERIA BLD CULT: NORMAL
BACTERIA WND CULT: ABNORMAL

## 2025-02-13 ENCOUNTER — OFFICE VISIT (OUTPATIENT)
Dept: SURGERY | Facility: CLINIC | Age: 40
End: 2025-02-13
Payer: MEDICAID

## 2025-02-13 VITALS
SYSTOLIC BLOOD PRESSURE: 110 MMHG | WEIGHT: 303 LBS | BODY MASS INDEX: 40.16 KG/M2 | OXYGEN SATURATION: 98 % | HEART RATE: 76 BPM | RESPIRATION RATE: 18 BRPM | HEIGHT: 73 IN | TEMPERATURE: 98 F | DIASTOLIC BLOOD PRESSURE: 74 MMHG

## 2025-02-13 DIAGNOSIS — K61.1 PERIRECTAL ABSCESS: Primary | ICD-10-CM

## 2025-02-13 PROCEDURE — 99215 OFFICE O/P EST HI 40 MIN: CPT | Mod: PBBFAC

## 2025-02-13 RX ORDER — CIPROFLOXACIN 500 MG/1
500 TABLET ORAL EVERY 12 HOURS
Qty: 14 TABLET | Refills: 0 | Status: SHIPPED | OUTPATIENT
Start: 2025-02-13 | End: 2025-02-20

## 2025-02-13 NOTE — PROGRESS NOTES
[facility] General Surgery Clinic Note    HPI: Dilan Renteria Jr. Is 38 y/o M with hx of diverticulitis and HTN presenting to clinic for 1 week followup. On 2/7, pt had a song-rectal abscess drainage in OR with purulent return and packing was left in place. Pt took out packing on 2/9. Pt was rx Augmentin (for one week), Oxycodone prn; pt was advised to take Metamucil and Miralax qd. Pt reports he has been able to take all of his medication, except was unable to get his oxycodone filled however pain was not an issue. On Monday, pt reports significant pain and discomfort, which was relieved by heat compresses and soaking in Epson salt.  Today, pt reports pain is around a 3-4/10. Pt has been taking advil / tylenol and epson salt baths for pain relief. Pt reports minimal drainage from wound. Pt denies any fever, chills, n/v, issues with PO intake. Pt reports BM have been somewhat loose with minimal discomfort.       PMH:   Past Medical History:   Diagnosis Date    Diverticulosis     HTN (hypertension)       Meds:   Current Outpatient Medications:     ALPRAZolam (XANAX) 0.5 MG tablet, Take 0.5 mg by mouth every evening., Disp: , Rfl:     amLODIPine (NORVASC) 10 MG tablet, Take 10 mg by mouth once daily., Disp: , Rfl:     amoxicillin-clavulanate 875-125mg (AUGMENTIN) 875-125 mg per tablet, Take 1 tablet by mouth every 12 (twelve) hours., Disp: 14 tablet, Rfl: 0    atenoloL (TENORMIN) 25 MG tablet, Take 1 tablet (25 mg total) by mouth once daily., Disp: 30 tablet, Rfl: 0    cyclobenzaprine (FLEXERIL) 5 MG tablet, Take 1 tablet (5 mg total) by mouth 3 (three) times daily as needed for Muscle spasms., Disp: 15 tablet, Rfl: 0    fluticasone propionate (FLONASE) 50 mcg/actuation nasal spray, 1 spray (50 mcg total) by Each Nostril route 2 (two) times daily as needed for Rhinitis., Disp: 15 g, Rfl: 0    polyethylene glycol (GLYCOLAX) 17 gram PwPk, Take 17 g by mouth once daily., Disp: 30 packet, Rfl: 11    psyllium (HYDROCIL) packet,  Take 1 packet by mouth once daily., Disp: 30 packet, Rfl: 11    cetirizine (ZYRTEC) 10 MG tablet, Take 1 tablet (10 mg total) by mouth once daily., Disp: 30 tablet, Rfl: 0    ciprofloxacin HCl (CIPRO) 500 MG tablet, Take 1 tablet (500 mg total) by mouth every 12 (twelve) hours. for 7 days, Disp: 14 tablet, Rfl: 0    esomeprazole (NEXIUM) 40 MG capsule, Take 1 capsule (40 mg total) by mouth before breakfast., Disp: 90 capsule, Rfl: 3    oxyCODONE (ROXICODONE) 5 MG immediate release tablet, Take 1 tablet (5 mg total) by mouth every 6 (six) hours as needed for Pain. (Patient not taking: Reported on 2/13/2025), Disp: 14 tablet, Rfl: 0  Allergies: Review of patient's allergies indicates:  No Known Allergies  Social History:   Social History     Tobacco Use    Smoking status: Former     Types: Cigarettes    Smokeless tobacco: Never   Substance Use Topics    Alcohol use: Not Currently    Drug use: Yes     Frequency: 7.0 times per week     Types: Marijuana     Family History: No family history on file.  Surgical History:   Past Surgical History:   Procedure Laterality Date    COLONOSCOPY, WITH POLYPECTOMY USING SNARE N/A 1/18/2023    Procedure: COLONOSCOPY, WITH POLYPECTOMY USING SNARE;  Surgeon: Aliza Pulido MD;  Location: [facility] ENDOSCOPY;  Service: Gastroenterology;  Laterality: N/A;    EGD, WITH CLOSED BIOPSY N/A 1/18/2023    Procedure: EGD, WITH CLOSED BIOPSY;  Surgeon: Aliza Pulido MD;  Location: [facility] ENDOSCOPY;  Service: Gastroenterology;  Laterality: N/A;    EXAMINATION UNDER ANESTHESIA N/A 2/7/2025    Procedure: Exam under anesthesia, possible I&D, possible seton placement;  Surgeon: Roge Meier Jr., MD;  Location: [facility] OR;  Service: General;  Laterality: N/A;    INCISION AND DRAINAGE OF ABSCESS N/A 2/7/2025    Procedure: INCISION AND DRAINAGE, ABSCESS;  Surgeon: Roge Meier Jr., MD;  Location: [facility] OR;  Service: General;  Laterality: N/A;     Review of Systems:  Skin: No rashes or  itching.  Head: Denies headache or recent trauma.  Eyes: Denies eye pain or double vision.  Neck: Denies swelling or hoarseness of voice.  Respiratory: Denies shortness of breath or chest pain  Cardiac: Denies palpitations or swelling in hands/feet.  Gastrointestinal: Denies nausea, denies vomiting.   Urinary: Denies dysuria or hematuria.  Vascular: Denies claudication or leg swelling.  Neuro: Denies motor deficits. Denies weakness.  Endocrine: Denies excessive sweating or cold intolerance.  Psych: Denies memory problems. Denies anxiety.    Objective:    Vitals:  Vitals:    02/13/25 1202   BP: 110/74   Pulse: 76   Resp: 18   Temp: 97.9 °F (36.6 °C)        Physical Exam:  Gen: NAD  Neuro: awake, alert, answering questions appropriately  CV: RRR  Resp: non-labored breathing, MEKA  Abd: soft, ND, NT  Ext: moves all 4 spontaneously and purposefully  Skin: warm, well perfused. The wound is c/d/I without signs of erythema, edema, induration, or fluctuance. Healthy tissue.         Micro/Path/Other:  Blood culture (2/6): no growth at 5 days  Wound culture (2/7): moderate E. Coli  Susceptibility   Escherichia coli     KANIKA     Ampicillin >=32 Resistant     Cefazolin (Other) 4 Intermediate     Cefazolin (Urine) 4 Sensitive     Cefepime <=0.12 Sensitive     Ceftriaxone <=0.25 Sensitive     Ciprofloxacin <=0.06 Sensitive     ESBL Neg Negative     Gentamicin <=1 Sensitive     Levofloxacin <=0.12 Sensitive     Meropenem <=0.25 Sensitive     Piperacillin/Tazobactam <=4 Sensitive     Trimeth/Sulfa >=320 Resistant      KOH prep (2/7): No fungal elements seen   Gram Stain (2/7):  Moderate WBC observed. No bacteria seen.  Fungal culture (2/7): Pending     Assessment/Plan:  Dilan Renteria Jr. Is 38 y/o M with hx of diverticulitis and HTN presenting to clinic for 1 week followup. On 2/7, pt had a song-rectal abscess drainage in OR with purulent return and packing was left in place (taken out 2/9). Wound culture with moderate E. Coli. Wound  showing improvement with reduced drainage, and no pain, swelling. Sensitivities show resistance to ampicillin, will prescribe new course.    - New course of cipro x 7 days prescribed based on sensitivities  - d/w patient signs and sxs of infection and return precautions. RTC if wound worsening or if persistent drainage.   - advised to continue taking Metamucil and Miralax  - RTC prn      Santy Enriquez, MS3  [facility] [employer]     I have seen and examined the patient with the medical student. I agree with the assessment and plan and have made the appropriate edits to the note above.     Brayan Sr MD  [facility] General Surgery, PGY-1

## 2025-02-13 NOTE — PROGRESS NOTES
[facility] General Surgery Clinic Note    HPI: Dilan Renteria Jr. Is 40 y/o M with hx of diverticulitis and HTN presenting to clinic for 1 week followup. On 2/7, pt had a song-rectal abscess drainage in OR with purulent return and packing was left in place. Pt took out packing on 2/9. Pt was rx Augmentin (for one week), Oxycodone prn; pt was advised to take Metamucil and Miralax qd. Pt reports he has been able to take all of his medication, except was unable to get his oxycodone filled. On Monday, pt reports significant pain and discomfort, which was relieved by heat compresses and soaking in Epson salt.  Today, pt reports pain is around a 3-4/10. Pt has been taking advil / tylenol and epson salt baths for pain relief. Pt reports minimal bleeding from wound. Pt denies any fever, chills, n/v, issues with PO intake. Pt reports BM have been loose with minimal discomfort.       PMH:   Past Medical History:   Diagnosis Date    Diverticulosis     HTN (hypertension)       Meds:   Current Outpatient Medications:     ALPRAZolam (XANAX) 0.5 MG tablet, Take 0.5 mg by mouth every evening., Disp: , Rfl:     amLODIPine (NORVASC) 10 MG tablet, Take 10 mg by mouth once daily., Disp: , Rfl:     amoxicillin-clavulanate 875-125mg (AUGMENTIN) 875-125 mg per tablet, Take 1 tablet by mouth every 12 (twelve) hours., Disp: 14 tablet, Rfl: 0    atenoloL (TENORMIN) 25 MG tablet, Take 1 tablet (25 mg total) by mouth once daily., Disp: 30 tablet, Rfl: 0    cetirizine (ZYRTEC) 10 MG tablet, Take 1 tablet (10 mg total) by mouth once daily., Disp: 30 tablet, Rfl: 0    cyclobenzaprine (FLEXERIL) 5 MG tablet, Take 1 tablet (5 mg total) by mouth 3 (three) times daily as needed for Muscle spasms., Disp: 15 tablet, Rfl: 0    esomeprazole (NEXIUM) 40 MG capsule, Take 1 capsule (40 mg total) by mouth before breakfast., Disp: 90 capsule, Rfl: 3    fluticasone propionate (FLONASE) 50 mcg/actuation nasal spray, 1 spray (50 mcg total) by Each Nostril route 2 (two)  times daily as needed for Rhinitis., Disp: 15 g, Rfl: 0    oxyCODONE (ROXICODONE) 5 MG immediate release tablet, Take 1 tablet (5 mg total) by mouth every 6 (six) hours as needed for Pain., Disp: 14 tablet, Rfl: 0    polyethylene glycol (GLYCOLAX) 17 gram PwPk, Take 17 g by mouth once daily., Disp: 30 packet, Rfl: 11    psyllium (HYDROCIL) packet, Take 1 packet by mouth once daily., Disp: 30 packet, Rfl: 11  Allergies: Review of patient's allergies indicates:  No Known Allergies  Social History:   Social History     Tobacco Use    Smoking status: Former     Types: Cigarettes    Smokeless tobacco: Never   Substance Use Topics    Alcohol use: Not Currently    Drug use: Yes     Frequency: 7.0 times per week     Types: Marijuana     Family History: No family history on file.  Surgical History:   Past Surgical History:   Procedure Laterality Date    COLONOSCOPY, WITH POLYPECTOMY USING SNARE N/A 1/18/2023    Procedure: COLONOSCOPY, WITH POLYPECTOMY USING SNARE;  Surgeon: Aliza Pulido MD;  Location: [facility] ENDOSCOPY;  Service: Gastroenterology;  Laterality: N/A;    EGD, WITH CLOSED BIOPSY N/A 1/18/2023    Procedure: EGD, WITH CLOSED BIOPSY;  Surgeon: Aliza Pulido MD;  Location: [facility] ENDOSCOPY;  Service: Gastroenterology;  Laterality: N/A;    EXAMINATION UNDER ANESTHESIA N/A 2/7/2025    Procedure: Exam under anesthesia, possible I&D, possible seton placement;  Surgeon: Roge Meier Jr., MD;  Location: [facility] OR;  Service: General;  Laterality: N/A;    INCISION AND DRAINAGE OF ABSCESS N/A 2/7/2025    Procedure: INCISION AND DRAINAGE, ABSCESS;  Surgeon: Roge Meier Jr., MD;  Location: [facility] OR;  Service: General;  Laterality: N/A;     Review of Systems:  Skin: No rashes or itching.  Head: Denies headache or recent trauma.  Eyes: Denies eye pain or double vision.  Neck: Denies swelling or hoarseness of voice.  Respiratory: Denies shortness of breath or chest pain  Cardiac: Denies palpitations or  swelling in hands/feet.  Gastrointestinal: Denies nausea, denies vomiting.   Urinary: Denies dysuria or hematuria.  Vascular: Denies claudication or leg swelling.  Neuro: Denies motor deficits. Denies weakness.  Endocrine: Denies excessive sweating or cold intolerance.  Psych: Denies memory problems. Denies anxiety.    Objective:    Vitals:  There were no vitals filed for this visit.     Physical Exam:  Gen: NAD  Neuro: awake, alert, answering questions appropriately  CV: RRR  Resp: non-labored breathing, MEKA  Abd: soft, ND, NT  Ext: moves all 4 spontaneously and purposefully  Skin: warm, well perfused. The wound is c/d/I without signs of erythema, edema, induration, or fluctuance. Healthy tissue.         Micro/Path/Other:  Blood culture (2/6): no growth at 5 days  Wound culture (2/7): moderate E. Coli  Susceptibility   Escherichia coli     KANIKA     Ampicillin >=32 Resistant     Cefazolin (Other) 4 Intermediate     Cefazolin (Urine) 4 Sensitive     Cefepime <=0.12 Sensitive     Ceftriaxone <=0.25 Sensitive     Ciprofloxacin <=0.06 Sensitive     ESBL Neg Negative     Gentamicin <=1 Sensitive     Levofloxacin <=0.12 Sensitive     Meropenem <=0.25 Sensitive     Piperacillin/Tazobactam <=4 Sensitive     Trimeth/Sulfa >=320 Resistant      KOH prep (2/7): No fungal elements seen   Gram Stain (2/7):  Moderate WBC observed. No bacteria seen.  Fungal culture (2/7): Pending     Assessment/Plan:  Dilan Renteria Jr. Is 40 y/o M with hx of diverticulitis and HTN presenting to clinic for 1 week followup. On 2/7, pt had a song-rectal abscess drainage in OR with purulent return and packing was left in place (taken out 2/9). Wound culture with moderate E. Coli.     - Followed up wound culture sensitives, will rx a course of cipro x 7 days  - d/w patient signs and sxs of infection and return precautions. Give us a call if wound worsening or if persistent drainage.   - advised to continue taking Metamucil and Miralax  - RTC prn       Santy Enriquez, MS3  [facility] [employer]

## 2025-02-21 LAB — FUNGUS SPEC CULT: NORMAL

## 2025-08-14 ENCOUNTER — HOSPITAL ENCOUNTER (EMERGENCY)
Facility: HOSPITAL | Age: 40
Discharge: HOME | End: 2025-08-15
Attending: FAMILY MEDICINE
Payer: MEDICAID

## 2025-08-14 VITALS
DIASTOLIC BLOOD PRESSURE: 81 MMHG | RESPIRATION RATE: 18 BRPM | HEIGHT: 74 IN | OXYGEN SATURATION: 97 % | HEART RATE: 63 BPM | BODY MASS INDEX: 38.89 KG/M2 | TEMPERATURE: 98 F | WEIGHT: 303 LBS | SYSTOLIC BLOOD PRESSURE: 130 MMHG

## 2025-08-14 DIAGNOSIS — L03.113 CELLULITIS OF RIGHT ARM: Primary | ICD-10-CM

## 2025-08-14 PROCEDURE — 99284 EMERGENCY DEPT VISIT MOD MDM: CPT

## 2025-08-14 RX ORDER — METHYLPREDNISOLONE 4 MG/1
TABLET ORAL
Qty: 21 EACH | Refills: 0 | Status: SHIPPED | OUTPATIENT
Start: 2025-08-14

## 2025-08-14 RX ORDER — CEPHALEXIN 500 MG/1
500 CAPSULE ORAL 4 TIMES DAILY
Qty: 28 CAPSULE | Refills: 0 | Status: SHIPPED | OUTPATIENT
Start: 2025-08-14 | End: 2025-08-21

## 2025-08-14 RX ORDER — IBUPROFEN 600 MG/1
600 TABLET, FILM COATED ORAL EVERY 6 HOURS PRN
Qty: 20 TABLET | Refills: 0 | Status: SHIPPED | OUTPATIENT
Start: 2025-08-14

## (undated) DEVICE — SOL IRRI STRL WATER 1000ML

## (undated) DEVICE — PENCIL ELECSURG ROCKER 15FT

## (undated) DEVICE — SOLIDIFIER BOTTLE 1500CC

## (undated) DEVICE — NDL MAGELLAN SAFETY 18G 1.5IN

## (undated) DEVICE — MANIFOLD 4 PORT

## (undated) DEVICE — DRAPE UNDERBUTTOCKS PCH STRL

## (undated) DEVICE — Device

## (undated) DEVICE — PAD CURAD NONADH 3X4IN

## (undated) DEVICE — GLOVE SENSICARE PI GRN 6.5

## (undated) DEVICE — DRAPE LEGGINGS CUFF 31X48IN

## (undated) DEVICE — SYR 10CC LUER LOCK

## (undated) DEVICE — BLADE SURG STAINLESS STEEL #11

## (undated) DEVICE — KIT SURGICAL COLON .25 1.1OZ

## (undated) DEVICE — KIT SURGICAL TURNOVER

## (undated) DEVICE — FORCEP BIOPSY RAD JAW 240CM

## (undated) DEVICE — ELECTRODE PATIENT RETURN DISP

## (undated) DEVICE — SNARE EXACTO COLD

## (undated) DEVICE — SOL NACL IRR 1000ML BTL

## (undated) DEVICE — GAUZE CURAD IODOFORM .25IN 5YD

## (undated) DEVICE — MOUTHPIECE ENDO 60FR

## (undated) DEVICE — JELLY SURGILUBE LUBE PKT 3GM

## (undated) DEVICE — GLOVE PROTEXIS PF LATEX 7.0

## (undated) DEVICE — GLOVE SENSICARE PI GRN 7

## (undated) DEVICE — TRAY SKIN SCRUB WET PREMIUM

## (undated) DEVICE — GLOVE SIGNATURE ESSNTL LTX 6.5

## (undated) DEVICE — TRAP ETRAP POLYP 50 TRAY